# Patient Record
Sex: FEMALE | Race: WHITE | NOT HISPANIC OR LATINO | Employment: FULL TIME | URBAN - METROPOLITAN AREA
[De-identification: names, ages, dates, MRNs, and addresses within clinical notes are randomized per-mention and may not be internally consistent; named-entity substitution may affect disease eponyms.]

---

## 2017-06-08 ENCOUNTER — ALLSCRIPTS OFFICE VISIT (OUTPATIENT)
Dept: OTHER | Facility: OTHER | Age: 52
End: 2017-06-08

## 2017-06-28 DIAGNOSIS — Z12.31 ENCOUNTER FOR SCREENING MAMMOGRAM FOR MALIGNANT NEOPLASM OF BREAST: ICD-10-CM

## 2018-01-13 VITALS
WEIGHT: 175 LBS | HEIGHT: 64 IN | BODY MASS INDEX: 29.88 KG/M2 | RESPIRATION RATE: 18 BRPM | TEMPERATURE: 98.2 F | HEART RATE: 80 BPM | DIASTOLIC BLOOD PRESSURE: 68 MMHG | SYSTOLIC BLOOD PRESSURE: 126 MMHG

## 2018-01-13 NOTE — RESULT NOTES
Verified Results  (1) COMPREHENSIVE METABOLIC PANEL [id number] 07:04QM Edgar Greenwood    Order Number: [id number]      National Kidney Disease Education Program recommendations are as follows:  GFR calculation is accurate only with a steady state creatinine  Chronic Kidney disease less than 60 ml/min/1 73 sq  meters  Kidney failure less than 15 ml/min/1 73 sq  meters  Test Name Result Flag Reference   GLUCOSE,RANDM 96 mg/dL     If the patient is fasting, the ADA then defines impaired fasting glucose as > 100 mg/dL and diabetes as > or equal to 123 mg/dL  SODIUM 140 mmol/L  136-145   POTASSIUM 4 4 mmol/L  3 5-5 3   CHLORIDE 106 mmol/L  100-108   CARBON DIOXIDE 27 mmol/L  21-32   ANION GAP (CALC) 7 mmol/L  4-13   BLOOD UREA NITROGEN 15 mg/dL  5-25   CREATININE 0 74 mg/dL  0 60-1 30   Standardized to IDMS reference method   CALCIUM 8 5 mg/dL  8 3-10 1   BILI, TOTAL 0 63 mg/dL  0 20-1 00   ALK PHOSPHATAS 54 U/L     ALT (SGPT) 19 U/L  12-78   AST(SGOT) 9 U/L  5-45   ALBUMIN 3 7 g/dL  3 5-5 0   TOTAL PROTEIN 6 9 g/dL  6 4-8 2   eGFR Non-African American      >60 0 ml/min/1 73sq m     (1) LIPID PANEL FASTING W DIRECT LDL REFLEX 04Apr2016 11:07AM Barbie Cheung Order Number: [id number]      Triglyceride:         Normal              <150 mg/dl       Borderline High    150-199 mg/dl       High               200-499 mg/dl       Very High          >499 mg/dl  Cholesterol:         Desirable        <200 mg/dl      Borderline High  200-239 mg/dl      High             >239 mg/dl  HDL Cholesterol:        High    >59 mg/dL      Low     <41 mg/dL  LDL Cholesterol:        Optimal          <100 mg/dl         Near Optimal     100-129 mg/dl        Above Optimal          Borderline High   130-159 mg/dl          High              160-189 mg/dl          Very High        >189 mg/dl  LDL CALCULATED:    This screening LDL is a calculated result    It does not have the accuracy of the Direct Measured LDL in the monitoring of patients with hyperlipidemia and/or statin therapy  Direct Measure LDL (FAH927) must be ordered separately in these patients  Test Name Result Flag Reference   CHOLESTEROL 173 mg/dL     LDL CHOLESTEROL CALCULATED 101 mg/dL H 0-100   TRIGLYCERIDES 41 mg/dL  <=150   Specimen collection should occur prior to N-Acetylcysteine or Metamizole administration due to the potential for falsely depressed results  HDL,DIRECT 64 mg/dL H 40-60     (1) TSH WITH FT4 REFLEX 04Apr2016 11:07AM Pj Chavezetta Order Number: [id number]    Patients undergoing fluorescein dye angiography may retain small amounts of fluorescein in the body for 48-72 hours post procedure  Samples containing fluorescein can produce falsely depressed TSH values  If the patient had this procedure,a specimen should be resubmitted post fluorescein clearance  The recommended reference ranges for TSH during pregnancy are as follows:  First trimester 0 1 to 2 5 uIU/mL  Second trimester  0 2 to 3 0 uIU/mL  Third trimester 0 3 to 3 0 uIU/m     Test Name Result Flag Reference   TSH 1 630 uIU/mL  0 358-3 740     (1) CBC/PLT/DIFF 04Apr2016 11:07AM Pj José Luis Order Number: [id number]     Order Number: [id number]     Test Name Result Flag Reference   WBC COUNT 7 02 Thousand/uL  4 31-10 16   RBC COUNT 4 70 Million/uL  3 81-5 12   HEMOGLOBIN 14 6 g/dL  11 5-15 4   HEMATOCRIT 42 6 %  34 8-46  1   MCV 91 fL  82-98   MCH 31 1 pg  26 8-34 3   MCHC 34 3 g/dL  31 4-37 4   RDW 13 1 %  11 6-15 1   MPV 9 7 fL  8 9-12 7   PLATELET COUNT 435 Thousands/uL  149-390   nRBC AUTOMATED 0 /100 WBCs     NEUTROPHILS RELATIVE PERCENT 68 %  43-75   LYMPHOCYTES RELATIVE PERCENT 21 %  14-44   MONOCYTES RELATIVE PERCENT 6 %  4-12   EOSINOPHILS RELATIVE PERCENT 4 %  0-6   BASOPHILS RELATIVE PERCENT 1 %  0-1   NEUTROPHILS ABSOLUTE COUNT 4 80 Thousands/µL  1 85-7 62   LYMPHOCYTES ABSOLUTE COUNT 1 46 Thousands/µL  0 60-4 47   MONOCYTES ABSOLUTE COUNT 0 43 Thousand/µL  0 17-1 22   EOSINOPHILS ABSOLUTE COUNT 0 28 Thousand/µL  0 00-0 61   BASOPHILS ABSOLUTE COUNT 0 04 Thousands/µL  0 00-0 10     (1) VITAMIN D 25-HYDROXY 04Apr2016 11:07AM [facility] Order Number: [id number]     Order Number: [id number]     Test Name Result Flag Reference   VIT D 25-HYDROX 23 5 ng/mL L 30 0-100 0       Discussion/Summary   OVERALL GOOD  YOU NEED SOME VITAMIN D3 DAILY, 2000IU       [facility]

## 2018-01-13 NOTE — PROGRESS NOTES
Assessment    1  Well adult on routine health check (V70 0) (Z00 00)   2  Lipid screening (V77 91) (Z13 220)   3  Colon cancer screening (V76 51) (Z12 11)    Plan  Allergic reaction    · EpiPen 2-Irvin 0 3 MG/0 3ML Injection Solution Auto-injector; use as directed  Allergic reaction, Fatigue, Lipid screening    · (1) ALLERGY, FOOD PANEL; Status:Active; Requested for:09Bxi4144;    · (1) CBC/PLT/DIFF; Status:Active; Requested for:89Ynl8940;    · (1) COMPREHENSIVE METABOLIC PANEL; Status:Active; Requested for:08Kbk1752;    · (1) LIPID PANEL FASTING W DIRECT LDL REFLEX; Status:Active; Requested  for:31Cop4386;    · (1) TSH WITH FT4 REFLEX; Status:Active; Requested for:31Kvw3608;    · (1) VITAMIN D 25-HYDROXY; Status:Active; Requested for:08Ehh9847;   Colon cancer screening    · COLONOSCOPY; Status:Active; Requested for:29Psy7148;    · 2 - Zeeshan Harrell MD, Kirk Gaytan  Colorectal Surgery, Gastroenterology Physician Referral   Consult  Status: Hold For - Scheduling  Requested for: 25UVN7146  Care Summary provided  : Yes    Discussion/Summary  health maintenance visit Currently, she eats a healthy diet and has an adequate exercise regimen  cervical cancer screening is current Breast cancer screening: mammogram has been ordered  Colorectal cancer screening: colonoscopy has been ordered  Screening lab work includes hemoglobin, glucose, lipid profile and thyroid function testing  The immunizations are up to date  Advice and education were given regarding vitamin D supplements  Patient discussion: discussed with the patient  Chief Complaint  Patient here for Annual Wellness Exam      History of Present Illness  HM, Adult Female: The patient is being seen for a health maintenance evaluation  General Health: The patient's health since the last visit is described as good  She has regular dental visits  She denies vision problems  She denies hearing loss  Immunizations status: up to date  Lifestyle:   She consumes a diverse and healthy diet  She exercises regularly  She does not use tobacco  She denies alcohol use  She denies drug use  Reproductive health:  she reports normal menses  pregnancy history:  Screening: cancer screening reviewed and updated  Cervical cancer screening includes a pap smear performed last year  Breast cancer screening includes a mammogram performed last year  She hasn't been previously screened for colorectal cancer  metabolic screening reviewed and updated  Metabolic screening includes lipid profile performed within the past five years, glucose screening performed last year and thyroid function test performed last year  Review of Systems    Constitutional: No fever, no chills, feels well, no tiredness, no recent weight gain or weight loss  Eyes: No complaints of eye pain, no red eyes, no eyesight problems, no discharge, no dry eyes, no itching of eyes  ENT: no complaints of earache, no loss of hearing, no nose bleeds, no nasal discharge, no sore throat, no hoarseness  Cardiovascular: No complaints of slow heart rate, no fast heart rate, no chest pain, no palpitations, no leg claudication, no lower extremity edema  Respiratory: No complaints of shortness of breath, no wheezing, no cough, no SOB on exertion, no orthopnea, no PND  Gastrointestinal: No complaints of abdominal pain, no constipation, no nausea or vomiting, no diarrhea, no bloody stools  Genitourinary: No complaints of dysuria, no incontinence, no pelvic pain, no dysmenorrhea, no vaginal discharge or bleeding  Musculoskeletal: No complaints of arthralgias, no myalgias, no joint swelling or stiffness, no limb pain or swelling  Integumentary: No complaints of skin rash or lesions, no itching, no skin wounds, no breast pain or lump  Neurological: No complaints of headache, no confusion, no convulsions, no numbness, no dizziness or fainting, no tingling, no limb weakness, no difficulty walking     Psychiatric: Not suicidal, no sleep disturbance, no anxiety or depression, no change in personality, no emotional problems  Endocrine: No complaints of proptosis, no hot flashes, no muscle weakness, no deepening of the voice, no feelings of weakness  Hematologic/Lymphatic: No complaints of swollen glands, no swollen glands in the neck, does not bleed easily, does not bruise easily  Active Problems    1  Allergic reaction (995 3) (T78 40XA)   2  Fatigue (780 79) (R53 83)   3  Lipid screening (V77 91) (Z13 220)   4  Thoracic outlet syndrome (353 0) (G54 0)    Past Medical History    · History of chest pain (V13 89) (Z24 812)   · Personal history of endometriosis (V13 29) (Z87 42)   · History of Thoracic outlet syndrome (353 0) (G54 0)    Surgical History    · History of Ankle Surgery   · History of Arthroscopy Shoulder Right   · History of Breast Surgery   · History of Extrapleural Rib Resection   · History of Hernia Repair   · History of Shoulder Surgery   · History of Thorax Excision Of First Rib   · History of Treatment Of Ankle Fracture   · History of Umbilical Hernia Repair    Family History    · Family history of Diabetes Mellitus (V18 0)    Social History    · Never A Smoker   · Never Drank Alcohol    Current Meds   1  EpiPen 2-Irvin 0 3 MG/0 3ML Injection Solution Auto-injector; use as directed; Therapy: [id number] to (Last Rx:21Jan2016)  Requested for: 21Jan2016 Ordered    Allergies    1  Ethanol    2  No Known Environmental Allergies   3  No Known Food Allergies    Vitals   Recorded: [id number] 09:37AM   Heart Rate 72   Respiration 16   Systolic 495   Diastolic 72   Height 5 ft 4 49 in   Weight 169 lb 4 oz   BMI Calculated 28 61   BSA Calculated 1 83     Physical Exam    Constitutional   General appearance: No acute distress, well appearing and well nourished  Head and Face   Head and face: Normal     Eyes   Conjunctiva and lids: No swelling, erythema or discharge  Pupils and irises: Equal, round, reactive to light      Ears, Nose, Mouth, and Throat   External inspection of ears and nose: Normal     Otoscopic examination: Tympanic membranes translucent with normal light reflex  Canals patent without erythema  Hearing: Normal     Nasal mucosa, septum, and turbinates: Normal without edema or erythema  Lips, teeth, and gums: Normal, good dentition  Oropharynx: Normal with no erythema, edema, exudate or lesions  Neck   Neck: Supple, symmetric, trachea midline, no masses  Thyroid: Normal, no thyromegaly  Pulmonary   Respiratory effort: No increased work of breathing or signs of respiratory distress  Auscultation of lungs: Clear to auscultation  Cardiovascular   Auscultation of heart: Normal rate and rhythm, normal S1 and S2, no murmurs  Examination of extremities for edema and/or varicosities: Normal     Abdomen   Abdomen: Non-tender, no masses  Liver and spleen: No hepatomegaly or splenomegaly  Lymphatic   Palpation of lymph nodes in neck: No lymphadenopathy  Palpation of lymph nodes in axillae: No lymphadenopathy  Musculoskeletal   Gait and station: Normal     Digits and nails: Normal without clubbing or cyanosis  Joints, bones, and muscles: Normal     Range of motion: Normal     Stability: Normal     Muscle strength/tone: Normal     Skin   Skin and subcutaneous tissue: Normal without rashes or lesions  Palpation of skin and subcutaneous tissue: Normal turgor  Neurologic   Cranial nerves: Cranial nerves II-XII intact  Cortical function: Normal mental status  Reflexes: 2+ and symmetric  Sensation: No sensory loss  Coordination: Normal finger to nose and heel to shin  Psychiatric   Judgment and insight: Normal     Orientation to person, place, and time: Normal     Recent and remote memory: Intact      Mood and affect: Normal        Results/Data  PHQ-2 Adult Depression Screening [id number] 09:39AM User, Ahs     Test Name Result Flag Reference   PHQ-2 Adult Depression Score 0     Q1: 0, Q2: 0   PHQ-2 Adult Depression Screening Negative         Signatures   Electronically signed by : Janes Moffett DO; Feb 25 2016  9:53AM EST                       (Author)

## 2018-01-18 NOTE — PROGRESS NOTES
Assessment    1  Allergic reaction (995 3) (T78 40XA)    Plan  Allergic reaction    · HydrOXYzine HCl - 25 MG Oral Tablet; 1-3 tabs po q 6 hours prn pruritis   · Bolivar Guzman MD, [facility]  (Allergy/Immunology) Physician Referral  Consult   Status: Hold For - Scheduling  Requested for: 47RMJ2265  Care Summary provided  : Yes    Chief Complaint  Patient here with possible allergic reaction that comes and goes not sure from what for 2 weeks      History of Present Illness  HPI: 2-3 WEEKS OF ITCHING THROAT, HIVES   Allergic Reaction: The patient is being seen for an initial evaluation of an allergic reaction  Symptoms:  hives, rash and throat tightness, but no facial swelling, no eyelid swelling, no lip swelling and no tongue swelling  No associated symptoms are reported  Current treatment includes allergen avoidance  Review of Systems    Constitutional: No fever, no chills, feels well, no tiredness, no recent weight gain or loss  ENT: no ear ache, no loss of hearing, no nosebleeds or nasal discharge, no sore throat or hoarseness  Cardiovascular: no complaints of slow or fast heart rate, no chest pain, no palpitations, no leg claudication or lower extremity edema  Respiratory: no complaints of shortness of breath, no wheezing, no dyspnea on exertion, no orthopnea or PND  Breasts: no complaints of breast pain, breast lump or nipple discharge  Gastrointestinal: no complaints of abdominal pain, no constipation, no nausea or diarrhea, no vomiting, no bloody stools  Genitourinary: no complaints of dysuria, no incontinence, no pelvic pain, no dysmenorrhea, no vaginal discharge or abnormal vaginal bleeding  Musculoskeletal: no complaints of arthralgia, no myalgia, no joint swelling or stiffness, no limb pain or swelling  Integumentary: as noted in HPI  Neurological: no complaints of headache, no confusion, no numbness or tingling, no dizziness or fainting  Active Problems    1   Allergic reaction (995 3) (T78 40XA)   2  Fatigue (780 79) (R53 83)   3  Lipid screening (V77 91) (Z13 220)   4  Thoracic outlet syndrome (353 0) (G54 0)    Past Medical History    1  History of chest pain (V13 89) (Z87 898)   2  Personal history of endometriosis (V13 29) (Z87 42)   3  History of Thoracic outlet syndrome (353 0) (G54 0)    Family History    1  Family history of Diabetes Mellitus (V18 0)    Social History    · Never A Smoker   · Never Drank Alcohol    Surgical History    1  History of Ankle Surgery   2  History of Arthroscopy Shoulder Right   3  History of Breast Surgery   4  History of Extrapleural Rib Resection   5  History of Hernia Repair   6  History of Shoulder Surgery   7  History of Thorax Excision Of First Rib   8  History of Treatment Of Ankle Fracture   9  History of Umbilical Hernia Repair    Current Meds   1  EpiPen 2-Irvin 0 3 MG/0 3ML Injection Solution Auto-injector; use as directed; Therapy: [id number] to (Last Rx:21Jan2016)  Requested for: 21Jan2016 Ordered    Allergies    1  Ethanol    2  No Known Environmental Allergies   3  No Known Food Allergies    Vitals   Recorded: 93AVB2254 10:40AM   Heart Rate 88   Respiration 20   Systolic 642   Diastolic 68   Height 5 ft 4 in   Weight 175 lb 8 oz   BMI Calculated 30 12   BSA Calculated 1 85     Physical Exam    Constitutional   General appearance: No acute distress, well appearing and well nourished  Pulmonary   Respiratory effort: No increased work of breathing or signs of respiratory distress  Auscultation of lungs: Clear to auscultation  Cardiovascular   Auscultation of heart: Normal rate and rhythm, normal S1 and S2, without murmurs  Abdomen   Abdomen: Non-tender, no masses  Liver and spleen: No hepatomegaly or splenomegaly  Lymphatic   Palpation of lymph nodes in neck: No lymphadenopathy           Signatures   Electronically signed by : Elena Randhawa DO; Jan 22 2016 10:52AM EST                       (Author)

## 2018-12-17 ENCOUNTER — OFFICE VISIT (OUTPATIENT)
Dept: FAMILY MEDICINE CLINIC | Facility: CLINIC | Age: 53
End: 2018-12-17
Payer: COMMERCIAL

## 2018-12-17 VITALS
BODY MASS INDEX: 33.32 KG/M2 | WEIGHT: 195.2 LBS | HEIGHT: 64 IN | HEART RATE: 62 BPM | OXYGEN SATURATION: 100 % | SYSTOLIC BLOOD PRESSURE: 118 MMHG | DIASTOLIC BLOOD PRESSURE: 80 MMHG

## 2018-12-17 DIAGNOSIS — R63.5 WEIGHT GAIN: Chronic | ICD-10-CM

## 2018-12-17 DIAGNOSIS — Z12.11 SCREENING FOR COLON CANCER: ICD-10-CM

## 2018-12-17 DIAGNOSIS — Z00.00 WELLNESS EXAMINATION: Primary | ICD-10-CM

## 2018-12-17 DIAGNOSIS — R53.83 FATIGUE, UNSPECIFIED TYPE: ICD-10-CM

## 2018-12-17 DIAGNOSIS — Z12.39 SCREENING FOR BREAST CANCER: ICD-10-CM

## 2018-12-17 DIAGNOSIS — Z13.1 SCREENING FOR DIABETES MELLITUS: ICD-10-CM

## 2018-12-17 PROCEDURE — 99396 PREV VISIT EST AGE 40-64: CPT | Performed by: FAMILY MEDICINE

## 2018-12-17 RX ORDER — CLONIDINE 0.1 MG/24H
PATCH, EXTENDED RELEASE TRANSDERMAL WEEKLY
Refills: 2 | COMMUNITY
Start: 2018-11-14 | End: 2021-10-15

## 2018-12-17 RX ORDER — LIDOCAINE 50 MG/G
OINTMENT TOPICAL
Refills: 1 | COMMUNITY
Start: 2018-11-14

## 2018-12-17 RX ORDER — TRAMADOL HYDROCHLORIDE 50 MG/1
50 TABLET ORAL
Refills: 0 | COMMUNITY
Start: 2018-10-04 | End: 2020-06-16 | Stop reason: ALTCHOICE

## 2018-12-17 RX ORDER — EPINEPHRINE 0.3 MG/.3ML
INJECTION SUBCUTANEOUS
COMMUNITY
Start: 2016-01-26 | End: 2020-12-30 | Stop reason: SDUPTHER

## 2018-12-17 NOTE — PROGRESS NOTES
Assessment/Plan:    No problem-specific Assessment & Plan notes found for this encounter  Diagnoses and all orders for this visit:    Wellness examination  Comments:  Overall healthy on exam   Pt to continue a lower carb diet, and try to get regular exercise  FBW ordered  Weight gain  Comments:  TSH incl with FBW, and pt referred to Nutrition Services  Orders:  -     Ambulatory referral to Nutrition Services; Future    Screening for diabetes mellitus  -     Lipid Panel with Direct LDL reflex; Future  -     Comprehensive metabolic panel; Future    Screening for breast cancer  Comments:  Mammogram ordered  Orders:  -     Mammo screening bilateral w 3d & cad; Future    Fatigue, unspecified type  -     CBC and differential; Future  -     TSH, 3rd generation with Free T4 reflex; Future    Screening for colon cancer  Comments:  Pt referred to GI locally  Orders:  -     Ambulatory referral to Gastroenterology; Future    Other orders  -     cloNIDine (CATAPRES-TTS-1) 0 1 mg/24 hr; APPLY 1 PATCH EVERY 3 DAYS  -     EPINEPHrine (EPIPEN 2-STEPH) 0 3 mg/0 3 mL SOAJ;   -     lidocaine (XYLOCAINE) 5 % ointment; APPLY TO AFFECTED AREAS AS DIRECTED  -     traMADol (ULTRAM) 50 mg tablet; Take 50 mg by mouth daily at bedtime          Subjective:      Patient ID: Ping Velasquez is a 48 y o  female  PAP smear is UTD - 1 5 years ago  Due for mammogram   Seeing Dentist soon  Trying to get regular exercise  Limited due to a past foot injury; followed by specialists with [facility]  No colonoscopy as of yet  Had the Flu Vaccine this year            The following portions of the patient's history were reviewed and updated as appropriate: allergies, current medications, past family history, past social history, past surgical history and problem list     Past Medical History:   Diagnosis Date    Chest pain     last assessed 07/08/13    Endometriosis     last assessed 06/10/14    Thoracic outlet syndrome Current Outpatient Prescriptions:     EPINEPHrine (EPIPEN 2-STEPH) 0 3 mg/0 3 mL SOAJ, , Disp: , Rfl:     cloNIDine (CATAPRES-TTS-1) 0 1 mg/24 hr, APPLY 1 PATCH EVERY 3 DAYS, Disp: , Rfl: 2    lidocaine (XYLOCAINE) 5 % ointment, APPLY TO AFFECTED AREAS AS DIRECTED , Disp: , Rfl: 1    traMADol (ULTRAM) 50 mg tablet, Take 50 mg by mouth daily at bedtime, Disp: , Rfl: 0    No Known Allergies    Social History   Substance Use Topics    Smoking status: Never Smoker    Smokeless tobacco: Not on file    Alcohol use No         Review of Systems   Constitutional: Negative for activity change  +Chronic trouble losing weight  Respiratory: Negative for shortness of breath  Cardiovascular: Negative for chest pain  Gastrointestinal: Negative for abdominal pain and blood in stool  Genitourinary: Negative for menstrual problem  Objective:      /80 (BP Location: Left arm, Patient Position: Sitting, Cuff Size: Standard)   Pulse 62   Ht 5' 3 9" (1 623 m)   Wt 88 5 kg (195 lb 3 2 oz)   SpO2 100%   BMI 33 61 kg/m²          Physical Exam   Constitutional: She is oriented to person, place, and time  She appears well-developed and well-nourished  No distress  HENT:   Head: Normocephalic and atraumatic  Right Ear: Hearing, tympanic membrane, external ear and ear canal normal    Left Ear: Hearing, tympanic membrane, external ear and ear canal normal    Mouth/Throat: Oropharynx is clear and moist  No oropharyngeal exudate  Eyes: Conjunctivae are normal    Neck: Normal range of motion  Neck supple  No thyromegaly present  Cardiovascular: Normal rate, regular rhythm and normal heart sounds  Exam reveals no gallop and no friction rub  No murmur heard  Pulmonary/Chest: Effort normal and breath sounds normal  No respiratory distress  She has no wheezes  She has no rales  Abdominal: Soft  Bowel sounds are normal  She exhibits no distension and no mass  There is no tenderness   There is no rebound and no guarding  Lymphadenopathy:     She has no cervical adenopathy  Neurological: She is alert and oriented to person, place, and time  Skin: She is not diaphoretic  Psychiatric: She has a normal mood and affect  Her behavior is normal  Judgment and thought content normal    Nursing note and vitals reviewed

## 2018-12-27 ENCOUNTER — LAB (OUTPATIENT)
Dept: LAB | Facility: CLINIC | Age: 53
End: 2018-12-27
Payer: COMMERCIAL

## 2018-12-27 DIAGNOSIS — Z13.1 SCREENING FOR DIABETES MELLITUS: ICD-10-CM

## 2018-12-27 DIAGNOSIS — R53.83 FATIGUE, UNSPECIFIED TYPE: ICD-10-CM

## 2018-12-27 LAB
ALBUMIN SERPL BCP-MCNC: 4 G/DL (ref 3.5–5)
ALP SERPL-CCNC: 68 U/L (ref 46–116)
ALT SERPL W P-5'-P-CCNC: 29 U/L (ref 12–78)
ANION GAP SERPL CALCULATED.3IONS-SCNC: 6 MMOL/L (ref 4–13)
AST SERPL W P-5'-P-CCNC: 18 U/L (ref 5–45)
BASOPHILS # BLD AUTO: 0.06 THOUSANDS/ΜL (ref 0–0.1)
BASOPHILS NFR BLD AUTO: 1 % (ref 0–1)
BILIRUB SERPL-MCNC: 0.4 MG/DL (ref 0.2–1)
BUN SERPL-MCNC: 14 MG/DL (ref 5–25)
CALCIUM SERPL-MCNC: 9.2 MG/DL (ref 8.3–10.1)
CHLORIDE SERPL-SCNC: 106 MMOL/L (ref 100–108)
CHOLEST SERPL-MCNC: 188 MG/DL (ref 50–200)
CO2 SERPL-SCNC: 28 MMOL/L (ref 21–32)
CREAT SERPL-MCNC: 0.84 MG/DL (ref 0.6–1.3)
EOSINOPHIL # BLD AUTO: 0.3 THOUSAND/ΜL (ref 0–0.61)
EOSINOPHIL NFR BLD AUTO: 4 % (ref 0–6)
ERYTHROCYTE [DISTWIDTH] IN BLOOD BY AUTOMATED COUNT: 12.3 % (ref 11.6–15.1)
GFR SERPL CREATININE-BSD FRML MDRD: 80 ML/MIN/1.73SQ M
GLUCOSE P FAST SERPL-MCNC: 91 MG/DL (ref 65–99)
HCT VFR BLD AUTO: 43.8 % (ref 34.8–46.1)
HDLC SERPL-MCNC: 55 MG/DL (ref 40–60)
HGB BLD-MCNC: 14.8 G/DL (ref 11.5–15.4)
IMM GRANULOCYTES # BLD AUTO: 0.02 THOUSAND/UL (ref 0–0.2)
IMM GRANULOCYTES NFR BLD AUTO: 0 % (ref 0–2)
LDLC SERPL CALC-MCNC: 120 MG/DL (ref 0–100)
LYMPHOCYTES # BLD AUTO: 2.33 THOUSANDS/ΜL (ref 0.6–4.47)
LYMPHOCYTES NFR BLD AUTO: 31 % (ref 14–44)
MCH RBC QN AUTO: 30.6 PG (ref 26.8–34.3)
MCHC RBC AUTO-ENTMCNC: 33.8 G/DL (ref 31.4–37.4)
MCV RBC AUTO: 91 FL (ref 82–98)
MONOCYTES # BLD AUTO: 0.54 THOUSAND/ΜL (ref 0.17–1.22)
MONOCYTES NFR BLD AUTO: 7 % (ref 4–12)
NEUTROPHILS # BLD AUTO: 4.33 THOUSANDS/ΜL (ref 1.85–7.62)
NEUTS SEG NFR BLD AUTO: 57 % (ref 43–75)
NRBC BLD AUTO-RTO: 0 /100 WBCS
PLATELET # BLD AUTO: 274 THOUSANDS/UL (ref 149–390)
PMV BLD AUTO: 9.4 FL (ref 8.9–12.7)
POTASSIUM SERPL-SCNC: 4.1 MMOL/L (ref 3.5–5.3)
PROT SERPL-MCNC: 8 G/DL (ref 6.4–8.2)
RBC # BLD AUTO: 4.83 MILLION/UL (ref 3.81–5.12)
SODIUM SERPL-SCNC: 140 MMOL/L (ref 136–145)
TRIGL SERPL-MCNC: 66 MG/DL
TSH SERPL DL<=0.05 MIU/L-ACNC: 3.15 UIU/ML (ref 0.36–3.74)
WBC # BLD AUTO: 7.58 THOUSAND/UL (ref 4.31–10.16)

## 2018-12-27 PROCEDURE — 36415 COLL VENOUS BLD VENIPUNCTURE: CPT

## 2018-12-27 PROCEDURE — 85025 COMPLETE CBC W/AUTO DIFF WBC: CPT

## 2018-12-27 PROCEDURE — 80061 LIPID PANEL: CPT

## 2018-12-27 PROCEDURE — 84443 ASSAY THYROID STIM HORMONE: CPT

## 2018-12-27 PROCEDURE — 80053 COMPREHEN METABOLIC PANEL: CPT

## 2018-12-30 NOTE — PROGRESS NOTES
OK to mail  Please let the patient know that their bloodwork was normal   Thanks     Dr Matt Alvarenga

## 2019-09-13 DIAGNOSIS — Z12.39 SCREENING FOR BREAST CANCER: Primary | ICD-10-CM

## 2019-09-13 DIAGNOSIS — Z12.11 SCREENING FOR COLON CANCER: ICD-10-CM

## 2019-12-19 ENCOUNTER — TELEPHONE (OUTPATIENT)
Dept: FAMILY MEDICINE CLINIC | Facility: CLINIC | Age: 54
End: 2019-12-19

## 2019-12-19 NOTE — TELEPHONE ENCOUNTER
After the scheduling mishap?? Marco Rodriguez was unable to stay due to her doc appt @ 4:15pm     She would like to get in by the end of the year  Can you fit her in as late as possible on Monday, 12/23/19? Thank you!

## 2019-12-23 ENCOUNTER — OFFICE VISIT (OUTPATIENT)
Dept: FAMILY MEDICINE CLINIC | Facility: CLINIC | Age: 54
End: 2019-12-23
Payer: COMMERCIAL

## 2019-12-23 ENCOUNTER — TELEPHONE (OUTPATIENT)
Dept: FAMILY MEDICINE CLINIC | Facility: CLINIC | Age: 54
End: 2019-12-23

## 2019-12-23 VITALS
SYSTOLIC BLOOD PRESSURE: 130 MMHG | OXYGEN SATURATION: 97 % | HEIGHT: 64 IN | WEIGHT: 194.2 LBS | TEMPERATURE: 97.8 F | HEART RATE: 86 BPM | BODY MASS INDEX: 33.16 KG/M2 | RESPIRATION RATE: 14 BRPM | DIASTOLIC BLOOD PRESSURE: 80 MMHG

## 2019-12-23 DIAGNOSIS — Z13.220 SCREENING, LIPID: ICD-10-CM

## 2019-12-23 DIAGNOSIS — Z13.1 SCREENING FOR DIABETES MELLITUS: ICD-10-CM

## 2019-12-23 DIAGNOSIS — Z12.31 ENCOUNTER FOR SCREENING MAMMOGRAM FOR BREAST CANCER: ICD-10-CM

## 2019-12-23 DIAGNOSIS — Z00.00 ANNUAL PHYSICAL EXAM: Primary | ICD-10-CM

## 2019-12-23 DIAGNOSIS — Z12.11 SCREENING FOR COLORECTAL CANCER: ICD-10-CM

## 2019-12-23 DIAGNOSIS — Z12.12 SCREENING FOR COLORECTAL CANCER: ICD-10-CM

## 2019-12-23 PROBLEM — M79.672 CHRONIC FOOT PAIN, LEFT: Status: ACTIVE | Noted: 2019-12-23

## 2019-12-23 PROBLEM — L50.1 IDIOPATHIC URTICARIA: Status: ACTIVE | Noted: 2017-06-08

## 2019-12-23 PROBLEM — G89.29 CHRONIC FOOT PAIN, LEFT: Status: ACTIVE | Noted: 2019-12-23

## 2019-12-23 PROCEDURE — 99396 PREV VISIT EST AGE 40-64: CPT | Performed by: FAMILY MEDICINE

## 2019-12-23 NOTE — TELEPHONE ENCOUNTER
PT ASKED FOR A REFERRAL FOR A NUTRIONIST AND WANTED TO MAKE SURE YOU DID PUT IN FOR THE COLOGARD  PLS ADVISE SHE WOULD LIKE ME TO EMAIL TO [email or web address]  [employer]

## 2019-12-23 NOTE — PROGRESS NOTES
[address] [facility]    NAME: Yahir Zuniga  AGE: 47 y o  SEX: female  : 1965     DATE: 2019     Assessment and Plan:     Problem List Items Addressed This Visit        Other    Annual physical exam - Primary    Encounter for screening mammogram for breast cancer    Relevant Orders    Mammo screening bilateral w 3d & cad    Screening for colorectal cancer    Relevant Orders    Ambulatory referral to Colorectal Surgery    Cologuard    Screening, lipid    Relevant Orders    Lipid Panel with Direct LDL reflex    Screening for diabetes mellitus    Relevant Orders    Comprehensive metabolic panel          Immunizations and preventive care screenings were discussed with patient today  Appropriate education was printed on patient's after visit summary  Counseling:  · Dental Health: discussed importance of regular tooth brushing, flossing, and dental visits  BMI Counseling: Body mass index is 33 36 kg/m²  The BMI is above normal  Nutrition recommendations include encouraging healthy choices of fruits and vegetables  Exercise recommendations include exercising 3-5 times per week  No pharmacotherapy was ordered  Return in 1 year (on 2020)  Chief Complaint:     Chief Complaint   Patient presents with    Annual Exam     Patient here for annual wellness exam       History of Present Illness:     Adult Annual Physical   Patient here for a comprehensive physical exam  The patient reports problems - foot issue  Diet and Physical Activity  · Diet/Nutrition: well balanced diet  · Exercise: no formal exercise  Depression Screening  PHQ-9 Depression Screening    PHQ-9:    Frequency of the following problems over the past two weeks:       Little interest or pleasure in doing things:  0 - not at all  Feeling down, depressed, or hopeless:  0 - not at all  PHQ-2 Score:  0       General Health  · Sleep: sleeps well  · Hearing: normal - bilateral   · Vision: no vision problems and most recent eye exam <1 year ago  · Dental: regular dental visits and brushes teeth twice daily  /GYN Health  · Patient is: postmenopausal  · Last menstrual period: n/a  · Contraceptive method: n/a  Review of Systems:     Review of Systems   Constitutional: Negative  HENT: Negative  Eyes: Negative  Respiratory: Negative  Cardiovascular: Negative  Gastrointestinal: Negative  Endocrine: Negative  Genitourinary: Negative  Musculoskeletal: Negative  Skin: Negative  Allergic/Immunologic: Negative  Neurological: Negative  Hematological: Negative  Psychiatric/Behavioral: Negative         Past Medical History:     Past Medical History:   Diagnosis Date    Chest pain     last assessed 07/08/13    Endometriosis     last assessed 06/10/14    Thoracic outlet syndrome       Past Surgical History:     Past Surgical History:   Procedure Laterality Date    ANKLE FRACTURE SURGERY      ANKLE SURGERY      BREAST SURGERY Left     HERNIA REPAIR      INCISION AND DRAINAGE ANTERIOR THORAX      RESECTION RIBS EXTRAPLEURAL      SHOULDER ARTHROSCOPY Right     UMBILICAL HERNIA REPAIR        Social History:     Social History     Socioeconomic History    Marital status: /Civil Union     Spouse name: None    Number of children: None    Years of education: None    Highest education level: None   Occupational History    None   Social Needs    Financial resource strain: None    Food insecurity:     Worry: None     Inability: None    Transportation needs:     Medical: None     Non-medical: None   Tobacco Use    Smoking status: Never Smoker   Substance and Sexual Activity    Alcohol use: No    Drug use: None    Sexual activity: None   Lifestyle    Physical activity:     Days per week: None     Minutes per session: None    Stress: None   Relationships    Social connections:     Talks on phone: None Gets together: None     Attends Alevism service: None     Active member of club or organization: None     Attends meetings of clubs or organizations: None     Relationship status: None    Intimate partner violence:     Fear of current or ex partner: None     Emotionally abused: None     Physically abused: None     Forced sexual activity: None   Other Topics Concern    None   Social History Narrative    None      Family History:     Family History   Problem Relation Age of Onset    Diabetes Father       Current Medications:     Current Outpatient Medications   Medication Sig Dispense Refill    cloNIDine (CATAPRES-TTS-1) 0 1 mg/24 hr APPLY 1 PATCH EVERY 3 DAYS  2    EPINEPHrine (EPIPEN 2-STEPH) 0 3 mg/0 3 mL SOAJ       lidocaine (XYLOCAINE) 5 % ointment APPLY TO AFFECTED AREAS AS DIRECTED   1    traMADol (ULTRAM) 50 mg tablet Take 50 mg by mouth daily at bedtime  0     No current facility-administered medications for this visit  Allergies: Allergies   Allergen Reactions    Alcohol Nausea Only      Physical Exam:     /80   Pulse 86   Temp 97 8 °F (36 6 °C)   Resp 14   Ht 5' 3 98" (1 625 m)   Wt 88 1 kg (194 lb 3 2 oz)   SpO2 97%   BMI 33 36 kg/m²     Physical Exam   Constitutional: She is oriented to person, place, and time  Vital signs are normal  She appears well-developed and well-nourished  HENT:   Head: Normocephalic and atraumatic  Nose: Nose normal    Mouth/Throat: Oropharynx is clear and moist    Eyes: Pupils are equal, round, and reactive to light  Conjunctivae, EOM and lids are normal    Neck: Normal range of motion  Neck supple  Cardiovascular: Normal rate, regular rhythm, S1 normal, S2 normal, normal heart sounds and intact distal pulses  Pulmonary/Chest: Effort normal and breath sounds normal    Abdominal: Soft  Bowel sounds are normal    Musculoskeletal: Normal range of motion  Neurological: She is alert and oriented to person, place, and time   She has normal strength and normal reflexes  Skin: Skin is warm, dry and intact  Psychiatric: She has a normal mood and affect  Her speech is normal and behavior is normal  Judgment and thought content normal  Cognition and memory are normal    Nursing note and vitals reviewed        Britta Renteria DO  [address] [facility]

## 2019-12-23 NOTE — PATIENT INSTRUCTIONS

## 2020-05-26 ENCOUNTER — TELEPHONE (OUTPATIENT)
Dept: FAMILY MEDICINE CLINIC | Facility: CLINIC | Age: 55
End: 2020-05-26

## 2020-05-27 DIAGNOSIS — Z13.1 SCREENING FOR DIABETES MELLITUS: Primary | ICD-10-CM

## 2020-05-29 ENCOUNTER — APPOINTMENT (OUTPATIENT)
Dept: LAB | Age: 55
End: 2020-05-29
Payer: COMMERCIAL

## 2020-05-29 DIAGNOSIS — Z13.1 SCREENING FOR DIABETES MELLITUS: ICD-10-CM

## 2020-05-29 DIAGNOSIS — Z13.220 SCREENING, LIPID: ICD-10-CM

## 2020-05-29 LAB
ALBUMIN SERPL BCP-MCNC: 3.8 G/DL (ref 3.5–5)
ALP SERPL-CCNC: 72 U/L (ref 46–116)
ALT SERPL W P-5'-P-CCNC: 26 U/L (ref 12–78)
ANION GAP SERPL CALCULATED.3IONS-SCNC: 5 MMOL/L (ref 4–13)
AST SERPL W P-5'-P-CCNC: 10 U/L (ref 5–45)
BILIRUB SERPL-MCNC: 0.4 MG/DL (ref 0.2–1)
BUN SERPL-MCNC: 19 MG/DL (ref 5–25)
CALCIUM SERPL-MCNC: 9 MG/DL (ref 8.3–10.1)
CHLORIDE SERPL-SCNC: 108 MMOL/L (ref 100–108)
CHOLEST SERPL-MCNC: 191 MG/DL (ref 50–200)
CO2 SERPL-SCNC: 26 MMOL/L (ref 21–32)
CREAT SERPL-MCNC: 0.81 MG/DL (ref 0.6–1.3)
GFR SERPL CREATININE-BSD FRML MDRD: 82 ML/MIN/1.73SQ M
GLUCOSE P FAST SERPL-MCNC: 92 MG/DL (ref 65–99)
HDLC SERPL-MCNC: 63 MG/DL
LDLC SERPL CALC-MCNC: 119 MG/DL (ref 0–100)
POTASSIUM SERPL-SCNC: 4.1 MMOL/L (ref 3.5–5.3)
PROT SERPL-MCNC: 7.5 G/DL (ref 6.4–8.2)
SODIUM SERPL-SCNC: 139 MMOL/L (ref 136–145)
TRIGL SERPL-MCNC: 46 MG/DL

## 2020-05-29 PROCEDURE — 80061 LIPID PANEL: CPT

## 2020-05-29 PROCEDURE — 80053 COMPREHEN METABOLIC PANEL: CPT

## 2020-05-29 PROCEDURE — 36415 COLL VENOUS BLD VENIPUNCTURE: CPT

## 2020-05-29 PROCEDURE — 83036 HEMOGLOBIN GLYCOSYLATED A1C: CPT

## 2020-05-30 LAB
EST. AVERAGE GLUCOSE BLD GHB EST-MCNC: 108 MG/DL
HBA1C MFR BLD: 5.4 %

## 2020-06-16 ENCOUNTER — OFFICE VISIT (OUTPATIENT)
Dept: FAMILY MEDICINE CLINIC | Facility: CLINIC | Age: 55
End: 2020-06-16
Payer: COMMERCIAL

## 2020-06-16 VITALS
HEIGHT: 64 IN | SYSTOLIC BLOOD PRESSURE: 122 MMHG | OXYGEN SATURATION: 98 % | RESPIRATION RATE: 16 BRPM | HEART RATE: 73 BPM | TEMPERATURE: 97.5 F | DIASTOLIC BLOOD PRESSURE: 80 MMHG | WEIGHT: 200 LBS | BODY MASS INDEX: 34.15 KG/M2

## 2020-06-16 DIAGNOSIS — R63.5 WEIGHT INCREASING: Primary | ICD-10-CM

## 2020-06-16 DIAGNOSIS — L50.1 IDIOPATHIC URTICARIA: ICD-10-CM

## 2020-06-16 PROCEDURE — 99213 OFFICE O/P EST LOW 20 MIN: CPT | Performed by: FAMILY MEDICINE

## 2020-06-16 PROCEDURE — 3008F BODY MASS INDEX DOCD: CPT | Performed by: FAMILY MEDICINE

## 2020-06-16 RX ORDER — CELECOXIB 200 MG/1
CAPSULE ORAL AS NEEDED
COMMUNITY
Start: 2020-05-28

## 2020-06-17 ENCOUNTER — TELEPHONE (OUTPATIENT)
Dept: FAMILY MEDICINE CLINIC | Facility: CLINIC | Age: 55
End: 2020-06-17

## 2020-06-17 DIAGNOSIS — R42 DIZZINESS: ICD-10-CM

## 2020-06-17 DIAGNOSIS — E55.9 VITAMIN D DEFICIENCY: Primary | ICD-10-CM

## 2020-06-24 ENCOUNTER — TELEMEDICINE (OUTPATIENT)
Dept: BARIATRICS | Facility: CLINIC | Age: 55
End: 2020-06-24
Payer: COMMERCIAL

## 2020-06-24 VITALS — BODY MASS INDEX: 32.78 KG/M2 | WEIGHT: 192 LBS | HEIGHT: 64 IN

## 2020-06-24 DIAGNOSIS — R63.5 WEIGHT INCREASING: ICD-10-CM

## 2020-06-24 DIAGNOSIS — E66.9 CLASS 1 OBESITY: Primary | ICD-10-CM

## 2020-06-24 PROBLEM — E66.811 CLASS 1 OBESITY: Status: ACTIVE | Noted: 2020-06-24

## 2020-06-24 PROCEDURE — 3008F BODY MASS INDEX DOCD: CPT | Performed by: PHYSICIAN ASSISTANT

## 2020-06-24 PROCEDURE — 99203 OFFICE O/P NEW LOW 30 MIN: CPT | Performed by: PHYSICIAN ASSISTANT

## 2020-06-26 ENCOUNTER — TELEPHONE (OUTPATIENT)
Dept: BARIATRICS | Facility: CLINIC | Age: 55
End: 2020-06-26

## 2020-06-29 DIAGNOSIS — E66.9 CLASS 1 OBESITY: Primary | ICD-10-CM

## 2020-06-29 DIAGNOSIS — R63.5 ABNORMAL WEIGHT GAIN: ICD-10-CM

## 2020-12-01 ENCOUNTER — OFFICE VISIT (OUTPATIENT)
Dept: FAMILY MEDICINE CLINIC | Facility: CLINIC | Age: 55
End: 2020-12-01
Payer: COMMERCIAL

## 2020-12-01 VITALS
RESPIRATION RATE: 16 BRPM | BODY MASS INDEX: 33.29 KG/M2 | TEMPERATURE: 97.1 F | OXYGEN SATURATION: 97 % | WEIGHT: 195 LBS | HEIGHT: 64 IN | DIASTOLIC BLOOD PRESSURE: 90 MMHG | HEART RATE: 81 BPM | SYSTOLIC BLOOD PRESSURE: 124 MMHG

## 2020-12-01 DIAGNOSIS — Z00.00 ANNUAL PHYSICAL EXAM: Primary | ICD-10-CM

## 2020-12-01 DIAGNOSIS — Z12.11 SCREENING FOR COLORECTAL CANCER: ICD-10-CM

## 2020-12-01 DIAGNOSIS — Z12.31 ENCOUNTER FOR SCREENING MAMMOGRAM FOR BREAST CANCER: ICD-10-CM

## 2020-12-01 DIAGNOSIS — Z12.12 SCREENING FOR COLORECTAL CANCER: ICD-10-CM

## 2020-12-01 PROBLEM — G57.82 NEURITIS OF LEFT SURAL NERVE: Status: ACTIVE | Noted: 2020-10-08

## 2020-12-01 PROBLEM — G57.92 NERVE ENTRAPMENT SYNDROME OF LEFT FOOT: Status: ACTIVE | Noted: 2020-10-08

## 2020-12-01 PROBLEM — M19.90 INFLAMMATORY ARTHROPATHY: Status: ACTIVE | Noted: 2020-10-08

## 2020-12-01 PROBLEM — R63.5 WEIGHT INCREASING: Status: RESOLVED | Noted: 2020-06-16 | Resolved: 2020-12-01

## 2020-12-01 PROCEDURE — 99396 PREV VISIT EST AGE 40-64: CPT | Performed by: FAMILY MEDICINE

## 2020-12-01 PROCEDURE — 3008F BODY MASS INDEX DOCD: CPT | Performed by: FAMILY MEDICINE

## 2020-12-01 PROCEDURE — 3725F SCREEN DEPRESSION PERFORMED: CPT | Performed by: FAMILY MEDICINE

## 2020-12-01 RX ORDER — MULTIVIT-MIN/IRON FUM/FOLIC AC 7.5 MG-4
1 TABLET ORAL DAILY
COMMUNITY

## 2020-12-01 RX ORDER — TRAMADOL HYDROCHLORIDE 100 MG/1
CAPSULE ORAL
COMMUNITY
Start: 2020-11-02 | End: 2021-10-15

## 2020-12-30 DIAGNOSIS — T78.40XS ALLERGIC REACTION, SEQUELA: Primary | ICD-10-CM

## 2020-12-30 RX ORDER — EPINEPHRINE 0.3 MG/.3ML
0.3 INJECTION SUBCUTANEOUS ONCE
Qty: 2 EACH | Refills: 1 | Status: SHIPPED | OUTPATIENT
Start: 2020-12-30 | End: 2021-10-15

## 2021-01-19 ENCOUNTER — TELEPHONE (OUTPATIENT)
Dept: FAMILY MEDICINE CLINIC | Facility: CLINIC | Age: 56
End: 2021-01-19

## 2021-01-19 NOTE — TELEPHONE ENCOUNTER
Patient called and wanted to know if you can put in a bone density scan for her , Mom and younger sister both have osteopetrosis and she would like to get checked   Please advise

## 2021-01-28 DIAGNOSIS — Z23 ENCOUNTER FOR IMMUNIZATION: ICD-10-CM

## 2021-02-06 ENCOUNTER — IMMUNIZATIONS (OUTPATIENT)
Dept: FAMILY MEDICINE CLINIC | Facility: HOSPITAL | Age: 56
End: 2021-02-06

## 2021-02-06 DIAGNOSIS — Z23 ENCOUNTER FOR IMMUNIZATION: Primary | ICD-10-CM

## 2021-02-06 PROCEDURE — 91300 SARS-COV-2 / COVID-19 MRNA VACCINE (PFIZER-BIONTECH) 30 MCG: CPT

## 2021-02-06 PROCEDURE — 0001A SARS-COV-2 / COVID-19 MRNA VACCINE (PFIZER-BIONTECH) 30 MCG: CPT

## 2021-02-27 ENCOUNTER — IMMUNIZATIONS (OUTPATIENT)
Dept: FAMILY MEDICINE CLINIC | Facility: HOSPITAL | Age: 56
End: 2021-02-27

## 2021-02-27 DIAGNOSIS — Z23 ENCOUNTER FOR IMMUNIZATION: Primary | ICD-10-CM

## 2021-02-27 PROCEDURE — 91300 SARS-COV-2 / COVID-19 MRNA VACCINE (PFIZER-BIONTECH) 30 MCG: CPT | Performed by: INTERNAL MEDICINE

## 2021-02-27 PROCEDURE — 0002A SARS-COV-2 / COVID-19 MRNA VACCINE (PFIZER-BIONTECH) 30 MCG: CPT | Performed by: INTERNAL MEDICINE

## 2021-03-19 ENCOUNTER — TELEPHONE (OUTPATIENT)
Dept: FAMILY MEDICINE CLINIC | Facility: CLINIC | Age: 56
End: 2021-03-19

## 2021-03-19 NOTE — TELEPHONE ENCOUNTER
Gadiel Johnson had her second pfizer vaccine on 02/27/2021  Since then she has had off and on calf pain similar to when she had blood clots  It only happens every other day once or twice a day  The pain is starteing to last longer and more painful  Spoke with Angelia Lerma and advised patient to go to the ER  Gadiel Johnson agreed to be evaluated at the ER

## 2021-06-11 ENCOUNTER — APPOINTMENT (OUTPATIENT)
Dept: LAB | Facility: CLINIC | Age: 56
End: 2021-06-11
Payer: COMMERCIAL

## 2021-06-11 DIAGNOSIS — R42 DIZZINESS: ICD-10-CM

## 2021-06-11 DIAGNOSIS — R63.5 WEIGHT INCREASING: ICD-10-CM

## 2021-06-11 DIAGNOSIS — L50.1 IDIOPATHIC URTICARIA: ICD-10-CM

## 2021-06-11 DIAGNOSIS — E66.9 CLASS 1 OBESITY: ICD-10-CM

## 2021-06-11 DIAGNOSIS — R63.5 ABNORMAL WEIGHT GAIN: ICD-10-CM

## 2021-06-11 DIAGNOSIS — E55.9 VITAMIN D DEFICIENCY: ICD-10-CM

## 2021-06-11 LAB
25(OH)D3 SERPL-MCNC: 22.9 NG/ML (ref 30–100)
CORTIS AM PEAK SERPL-MCNC: 9.2 UG/DL (ref 4.2–22.4)
ERYTHROCYTE [DISTWIDTH] IN BLOOD BY AUTOMATED COUNT: 12.5 % (ref 11.6–15.1)
FERRITIN SERPL-MCNC: 51 NG/ML (ref 8–388)
HCT VFR BLD AUTO: 43 % (ref 34.8–46.1)
HGB BLD-MCNC: 13.9 G/DL (ref 11.5–15.4)
INSULIN SERPL-ACNC: 9.8 MU/L (ref 3–25)
IRON SERPL-MCNC: 79 UG/DL (ref 50–170)
MCH RBC QN AUTO: 29.3 PG (ref 26.8–34.3)
MCHC RBC AUTO-ENTMCNC: 32.3 G/DL (ref 31.4–37.4)
MCV RBC AUTO: 91 FL (ref 82–98)
PLATELET # BLD AUTO: 260 THOUSANDS/UL (ref 149–390)
PMV BLD AUTO: 9.6 FL (ref 8.9–12.7)
RBC # BLD AUTO: 4.75 MILLION/UL (ref 3.81–5.12)
TSH SERPL DL<=0.05 MIU/L-ACNC: 1.74 UIU/ML (ref 0.36–3.74)
WBC # BLD AUTO: 6.53 THOUSAND/UL (ref 4.31–10.16)

## 2021-06-11 PROCEDURE — 82533 TOTAL CORTISOL: CPT

## 2021-06-11 PROCEDURE — 82728 ASSAY OF FERRITIN: CPT

## 2021-06-11 PROCEDURE — 36415 COLL VENOUS BLD VENIPUNCTURE: CPT

## 2021-06-11 PROCEDURE — 85027 COMPLETE CBC AUTOMATED: CPT

## 2021-06-11 PROCEDURE — 83540 ASSAY OF IRON: CPT

## 2021-06-11 PROCEDURE — 84443 ASSAY THYROID STIM HORMONE: CPT

## 2021-06-11 PROCEDURE — 83525 ASSAY OF INSULIN: CPT

## 2021-06-11 PROCEDURE — 82306 VITAMIN D 25 HYDROXY: CPT

## 2021-07-02 ENCOUNTER — VBI (OUTPATIENT)
Dept: ADMINISTRATIVE | Facility: OTHER | Age: 56
End: 2021-07-02

## 2021-10-14 ENCOUNTER — TELEPHONE (OUTPATIENT)
Dept: FAMILY MEDICINE CLINIC | Facility: CLINIC | Age: 56
End: 2021-10-14

## 2021-11-19 ENCOUNTER — RA CDI HCC (OUTPATIENT)
Dept: OTHER | Facility: HOSPITAL | Age: 56
End: 2021-11-19

## 2021-12-22 ENCOUNTER — TELEPHONE (OUTPATIENT)
Dept: FAMILY MEDICINE CLINIC | Facility: CLINIC | Age: 56
End: 2021-12-22

## 2022-01-03 ENCOUNTER — TELEPHONE (OUTPATIENT)
Dept: FAMILY MEDICINE CLINIC | Facility: CLINIC | Age: 57
End: 2022-01-03

## 2022-02-04 ENCOUNTER — CONSULT (OUTPATIENT)
Dept: GASTROENTEROLOGY | Facility: AMBULARY SURGERY CENTER | Age: 57
End: 2022-02-04
Payer: COMMERCIAL

## 2022-02-04 ENCOUNTER — RA CDI HCC (OUTPATIENT)
Dept: OTHER | Facility: HOSPITAL | Age: 57
End: 2022-02-04

## 2022-02-04 VITALS
DIASTOLIC BLOOD PRESSURE: 88 MMHG | HEIGHT: 64 IN | HEART RATE: 75 BPM | SYSTOLIC BLOOD PRESSURE: 142 MMHG | OXYGEN SATURATION: 99 % | WEIGHT: 208 LBS | BODY MASS INDEX: 35.51 KG/M2

## 2022-02-04 DIAGNOSIS — Z12.11 COLON CANCER SCREENING: Primary | ICD-10-CM

## 2022-02-04 PROCEDURE — 99244 OFF/OP CNSLTJ NEW/EST MOD 40: CPT | Performed by: INTERNAL MEDICINE

## 2022-02-04 RX ORDER — AMOXICILLIN 500 MG/1
TABLET, FILM COATED ORAL
COMMUNITY
Start: 2021-11-06 | End: 2022-02-17

## 2022-02-04 RX ORDER — CLONIDINE 0.2 MG/24H
PATCH, EXTENDED RELEASE TRANSDERMAL AS NEEDED
COMMUNITY
Start: 2022-01-11

## 2022-02-04 RX ORDER — TRAMADOL HYDROCHLORIDE 100 MG/1
CAPSULE ORAL
COMMUNITY
Start: 2021-10-30

## 2022-02-04 RX ORDER — MELOXICAM 15 MG/1
TABLET ORAL
COMMUNITY
Start: 2022-01-20

## 2022-02-04 RX ORDER — SODIUM PICOSULFATE, MAGNESIUM OXIDE, AND ANHYDROUS CITRIC ACID 10; 3.5; 12 MG/160ML; G/160ML; G/160ML
LIQUID ORAL
Qty: 320 ML | Refills: 0 | Status: SHIPPED | OUTPATIENT
Start: 2022-02-04 | End: 2022-02-17

## 2022-02-04 NOTE — H&P (VIEW-ONLY)
Consultation - [facility]  Osbaldo Cárdenas 62 y o  female MRN: [id number]  Unit/Bed#:  Encounter: [id number]        Consults    ASSESSMENT/PLAN:       1  Colon cancer screening-average risk, no family history of colon cancer  No previous colonoscopy  -will schedule for average risk screening colonoscopy at this time  - Emphasized the importance of adequate bowel prep     -Sent over the prescription for clenpiq prep and instructions were given in the office     - Patient was explained about  the risks and benefits of the procedure  Risks including but not limited to bleeding, infection, perforation were explained in detail  Also explained about less than 100% sensitivity with the exam and other alternatives  ______________________________________________________________________    Reason for Consult / Principal Problem: [unfilled]    HPI: Osbaldo Cárdenas is a 62y o  year old female with history of thoracic outlet syndrome, nerve entrapment syndrome, remote history of a DVT, was previously on anticoagulation, has not been on any anticoagulation recently presents for colon cancer screening evaluation  Patient denies any family history of colon cancer  She denies any change in bowel habits, hematochezia or abdominal pain  She does report history of hemorrhoids  She denies any symptoms of acid reflux, dysphagia, odynophagia, loss of appetite or early satiety  No hematemesis, coffee-ground emesis or melena  She does take meloxicam on as-needed basis  No lower extremity edema, shortness of breath or chest pain  Labs are reviewed, liver enzymes are normal on most recent labs, hemoglobin 13 9, platelets 494  Review of Systems: The remainder of the review of systems was negative except for the pertinent positives noted in HPI       Historical Information   Past Medical History:   Diagnosis Date    Chest pain     last assessed 07/08/13    Endometriosis     last assessed 06/10/14    Thoracic outlet syndrome      Past Surgical History:   Procedure Laterality Date    ANKLE FRACTURE SURGERY      ANKLE SURGERY      BREAST SURGERY Left     HERNIA REPAIR      INCISION AND DRAINAGE ANTERIOR THORAX      RESECTION RIBS EXTRAPLEURAL      SHOULDER ARTHROSCOPY Right     UMBILICAL HERNIA REPAIR       Social History   Social History     Substance and Sexual Activity   Alcohol Use No     Social History     Substance and Sexual Activity   Drug Use Not on file     Social History     Tobacco Use   Smoking Status Never Smoker   Smokeless Tobacco Never Used     Family History   Problem Relation Age of Onset    Diabetes Father     Thyroid disease Sister     Thyroid disease Maternal Aunt     Breast cancer Maternal Aunt     Stroke Maternal Grandmother     Thyroid disease Cousin     Thyroid cancer Cousin     Heart disease Neg Hx        Meds/Allergies     (Not in a hospital admission)    No current facility-administered medications for this visit  Allergies   Allergen Reactions    Alcohol - Food Allergy Nausea Only       Objective     Blood pressure 142/88, pulse 75, height 5' 4" (1 626 m), weight 94 3 kg (208 lb), SpO2 99 %  [unfilled]    PHYSICAL EXAM     GEN: well nourished, well developed, no acute distress  HEENT: anicteric, MMM  CV: RRR, no m/r/g  CHEST: CTA b/l, no WRR  ABD: +BS, soft, NT/ND, no hepatosplenomegaly  EXT: no c/c/e  SKIN: no rashes,  NEURO: aaox3    Lab Results:   No visits with results within 1 Day(s) from this visit     Latest known visit with results is:   Appointment on 06/11/2021   Component Date Value    WBC 06/11/2021 6 53     RBC 06/11/2021 4 75     Hemoglobin 06/11/2021 13 9     Hematocrit 06/11/2021 43 0     MCV 06/11/2021 91     MCH 06/11/2021 29 3     MCHC 06/11/2021 32 3     RDW 06/11/2021 12 5     Platelets 91/71/5856 260     MPV 06/11/2021 9 6     TSH 3RD GENERATON 06/11/2021 1 740     Vit D, 25-Hydroxy 06/11/2021 22 9*    Iron 06/11/2021 79     Ferritin 06/11/2021 51     Insulin, Fasting 06/11/2021 9 8     Cortisol - AM 06/11/2021 9 2      Imaging Studies: I have personally reviewed pertinent films in PACS

## 2022-02-04 NOTE — PROGRESS NOTES
Consultation - [address] [facility]  Santiago Simmons 62 y o  female MRN: [id number]  Unit/Bed#:  Encounter: [id number]        Consults    ASSESSMENT/PLAN:       1  Colon cancer screening-average risk, no family history of colon cancer  No previous colonoscopy  -will schedule for average risk screening colonoscopy at this time  - Emphasized the importance of adequate bowel prep     -Sent over the prescription for clenpiq prep and instructions were given in the office     - Patient was explained about  the risks and benefits of the procedure  Risks including but not limited to bleeding, infection, perforation were explained in detail  Also explained about less than 100% sensitivity with the exam and other alternatives  ______________________________________________________________________    Reason for Consult / Principal Problem: [unfilled]    HPI: Santiago Simmons is a 62y o  year old female with history of thoracic outlet syndrome, nerve entrapment syndrome, remote history of a DVT, was previously on anticoagulation, has not been on any anticoagulation recently presents for colon cancer screening evaluation  Patient denies any family history of colon cancer  She denies any change in bowel habits, hematochezia or abdominal pain  She does report history of hemorrhoids  She denies any symptoms of acid reflux, dysphagia, odynophagia, loss of appetite or early satiety  No hematemesis, coffee-ground emesis or melena  She does take meloxicam on as-needed basis  No lower extremity edema, shortness of breath or chest pain  Labs are reviewed, liver enzymes are normal on most recent labs, hemoglobin 13 9, platelets 938  Review of Systems: The remainder of the review of systems was negative except for the pertinent positives noted in HPI       Historical Information   Past Medical History:   Diagnosis Date    Chest pain     last assessed 07/08/13    Endometriosis     last assessed 06/10/14    Thoracic outlet syndrome      Past Surgical History:   Procedure Laterality Date    ANKLE FRACTURE SURGERY      ANKLE SURGERY      BREAST SURGERY Left     HERNIA REPAIR      INCISION AND DRAINAGE ANTERIOR THORAX      RESECTION RIBS EXTRAPLEURAL      SHOULDER ARTHROSCOPY Right     UMBILICAL HERNIA REPAIR       Social History   Social History     Substance and Sexual Activity   Alcohol Use No     Social History     Substance and Sexual Activity   Drug Use Not on file     Social History     Tobacco Use   Smoking Status Never Smoker   Smokeless Tobacco Never Used     Family History   Problem Relation Age of Onset    Diabetes Father     Thyroid disease Sister     Thyroid disease Maternal Aunt     Breast cancer Maternal Aunt     Stroke Maternal Grandmother     Thyroid disease Cousin     Thyroid cancer Cousin     Heart disease Neg Hx        Meds/Allergies     (Not in a hospital admission)    No current facility-administered medications for this visit  Allergies   Allergen Reactions    Alcohol - Food Allergy Nausea Only       Objective     Blood pressure 142/88, pulse 75, height 5' 4" (1 626 m), weight 94 3 kg (208 lb), SpO2 99 %  [unfilled]    PHYSICAL EXAM     GEN: well nourished, well developed, no acute distress  HEENT: anicteric, MMM  CV: RRR, no m/r/g  CHEST: CTA b/l, no WRR  ABD: +BS, soft, NT/ND, no hepatosplenomegaly  EXT: no c/c/e  SKIN: no rashes,  NEURO: aaox3    Lab Results:   No visits with results within 1 Day(s) from this visit     Latest known visit with results is:   Appointment on 06/11/2021   Component Date Value    WBC 06/11/2021 6 53     RBC 06/11/2021 4 75     Hemoglobin 06/11/2021 13 9     Hematocrit 06/11/2021 43 0     MCV 06/11/2021 91     MCH 06/11/2021 29 3     MCHC 06/11/2021 32 3     RDW 06/11/2021 12 5     Platelets 60/94/8733 260     MPV 06/11/2021 9 6     TSH 3RD GENERATON 06/11/2021 1 740     Vit D, 25-Hydroxy 06/11/2021 22 9*    Iron 06/11/2021 79     Ferritin 06/11/2021 51     Insulin, Fasting 06/11/2021 9 8     Cortisol - AM 06/11/2021 9 2      Imaging Studies: I have personally reviewed pertinent films in PACS

## 2022-02-04 NOTE — LETTER
February 4, 2022     Referral [address] [place] [id number]    Patient: Raz Galindo   YOB: 1965   Date of Visit: 2/4/2022       Dear Dr Nanette Valdivia:    Thank you for referring Mary Wolf to me for evaluation  Below are my notes for this consultation  If you have questions, please do not hesitate to call me  I look forward to following your patient along with you  Sincerely,        Krystal Savage MD        CC: Vilma Liz, Fide Hargrove MD  2/4/2022  2:52 PM  Sign when Signing Visit  Consultation - [address] [facility]  Raz Galindo 62 y o  female MRN: [id number]  Unit/Bed#:  Encounter: [id number]        Consults    ASSESSMENT/PLAN:       1  Colon cancer screening-average risk, no family history of colon cancer  No previous colonoscopy  -will schedule for average risk screening colonoscopy at this time  - Emphasized the importance of adequate bowel prep     -Sent over the prescription for clenpiq prep and instructions were given in the office     - Patient was explained about  the risks and benefits of the procedure  Risks including but not limited to bleeding, infection, perforation were explained in detail  Also explained about less than 100% sensitivity with the exam and other alternatives  ______________________________________________________________________    Reason for Consult / Principal Problem: [unfilled]    HPI: Raz Galindo is a 62y o  year old female with history of thoracic outlet syndrome, nerve entrapment syndrome, remote history of a DVT, was previously on anticoagulation, has not been on any anticoagulation recently presents for colon cancer screening evaluation  Patient denies any family history of colon cancer  She denies any change in bowel habits, hematochezia or abdominal pain  She does report history of hemorrhoids    She denies any symptoms of acid reflux, dysphagia, odynophagia, loss of appetite or early satiety  No hematemesis, coffee-ground emesis or melena  She does take meloxicam on as-needed basis  No lower extremity edema, shortness of breath or chest pain  Labs are reviewed, liver enzymes are normal on most recent labs, hemoglobin 13 9, platelets 170  Review of Systems: The remainder of the review of systems was negative except for the pertinent positives noted in HPI  Historical Information   Past Medical History:   Diagnosis Date    Chest pain     last assessed 07/08/13    Endometriosis     last assessed 06/10/14    Thoracic outlet syndrome      Past Surgical History:   Procedure Laterality Date    ANKLE FRACTURE SURGERY      ANKLE SURGERY      BREAST SURGERY Left     HERNIA REPAIR      INCISION AND DRAINAGE ANTERIOR THORAX      RESECTION RIBS EXTRAPLEURAL      SHOULDER ARTHROSCOPY Right     UMBILICAL HERNIA REPAIR       Social History   Social History     Substance and Sexual Activity   Alcohol Use No     Social History     Substance and Sexual Activity   Drug Use Not on file     Social History     Tobacco Use   Smoking Status Never Smoker   Smokeless Tobacco Never Used     Family History   Problem Relation Age of Onset    Diabetes Father     Thyroid disease Sister     Thyroid disease Maternal Aunt     Breast cancer Maternal Aunt     Stroke Maternal Grandmother     Thyroid disease Cousin     Thyroid cancer Cousin     Heart disease Neg Hx        Meds/Allergies     (Not in a hospital admission)    No current facility-administered medications for this visit  Allergies   Allergen Reactions    Alcohol - Food Allergy Nausea Only       Objective     Blood pressure 142/88, pulse 75, height 5' 4" (1 626 m), weight 94 3 kg (208 lb), SpO2 99 %      [unfilled]    PHYSICAL EXAM     GEN: well nourished, well developed, no acute distress  HEENT: anicteric, MMM  CV: RRR, no m/r/g  CHEST: CTA b/l, no WRR  ABD: +BS, soft, NT/ND, no hepatosplenomegaly  EXT: no c/c/e  SKIN: no minh,  NEURO: aaox3    Lab Results:   No visits with results within 1 Day(s) from this visit     Latest known visit with results is:   Appointment on 06/11/2021   Component Date Value    WBC 06/11/2021 6 53     RBC 06/11/2021 4 75     Hemoglobin 06/11/2021 13 9     Hematocrit 06/11/2021 43 0     MCV 06/11/2021 91     MCH 06/11/2021 29 3     MCHC 06/11/2021 32 3     RDW 06/11/2021 12 5     Platelets 79/78/2666 260     MPV 06/11/2021 9 6     TSH 3RD GENERATON 06/11/2021 1 740     Vit D, 25-Hydroxy 06/11/2021 22 9*    Iron 06/11/2021 79     Ferritin 06/11/2021 51     Insulin, Fasting 06/11/2021 9 8     Cortisol - AM 06/11/2021 9 2      Imaging Studies: I have personally reviewed pertinent films in PACS

## 2022-02-04 NOTE — PATIENT INSTRUCTIONS
Scheduled date of colonoscopy (as of today):2/23/2022  Physician performing colonoscopy:DR BLAKELY  Location of colonoscopy:[facility]  Bowel prep reviewed with patient:CLENPIQ PER DR SEGOVIA  Instructions reviewed with patient by:MARYAN GUNTER  Clearances: PT VACCINATED

## 2022-02-04 NOTE — PROGRESS NOTES
[facility] 75  coding opportunities       Chart reviewed, no opportunity found: CHART REVIEWED, NO OPPORTUNITY FOUND                        Patients insurance company: [employer] (Medicare Advantage and Commercial)

## 2022-02-08 ENCOUNTER — TELEPHONE (OUTPATIENT)
Dept: FAMILY MEDICINE CLINIC | Facility: CLINIC | Age: 57
End: 2022-02-08

## 2022-02-08 ENCOUNTER — TELEPHONE (OUTPATIENT)
Dept: ADMINISTRATIVE | Facility: OTHER | Age: 57
End: 2022-02-08

## 2022-02-08 ENCOUNTER — OFFICE VISIT (OUTPATIENT)
Dept: FAMILY MEDICINE CLINIC | Facility: CLINIC | Age: 57
End: 2022-02-08
Payer: COMMERCIAL

## 2022-02-08 VITALS
WEIGHT: 203.6 LBS | HEIGHT: 64 IN | OXYGEN SATURATION: 99 % | RESPIRATION RATE: 13 BRPM | HEART RATE: 85 BPM | SYSTOLIC BLOOD PRESSURE: 148 MMHG | BODY MASS INDEX: 34.76 KG/M2 | DIASTOLIC BLOOD PRESSURE: 90 MMHG | TEMPERATURE: 97.3 F

## 2022-02-08 DIAGNOSIS — Z00.00 ANNUAL PHYSICAL EXAM: Primary | ICD-10-CM

## 2022-02-08 DIAGNOSIS — E55.9 VITAMIN D DEFICIENCY: ICD-10-CM

## 2022-02-08 DIAGNOSIS — Z13.220 SCREENING, LIPID: ICD-10-CM

## 2022-02-08 DIAGNOSIS — F11.20 CONTINUOUS OPIOID DEPENDENCE (HCC): ICD-10-CM

## 2022-02-08 DIAGNOSIS — M19.90 INFLAMMATORY ARTHROPATHY: ICD-10-CM

## 2022-02-08 DIAGNOSIS — Z13.1 SCREENING FOR DIABETES MELLITUS: ICD-10-CM

## 2022-02-08 PROCEDURE — 1036F TOBACCO NON-USER: CPT | Performed by: FAMILY MEDICINE

## 2022-02-08 PROCEDURE — 3725F SCREEN DEPRESSION PERFORMED: CPT | Performed by: FAMILY MEDICINE

## 2022-02-08 PROCEDURE — 3008F BODY MASS INDEX DOCD: CPT | Performed by: FAMILY MEDICINE

## 2022-02-08 PROCEDURE — 99396 PREV VISIT EST AGE 40-64: CPT | Performed by: FAMILY MEDICINE

## 2022-02-08 NOTE — PROGRESS NOTES
[address] [facility] [address]    NAME: Lyle Ho  AGE: 62 y o  SEX: female  : 1965     DATE: 2022     Assessment and Plan:     Problem List Items Addressed This Visit        Musculoskeletal and Integument    Inflammatory arthropathy    Relevant Orders    TSH, 3rd generation with Free T4 reflex       Other    Annual physical exam - Primary     Labs ordered  Thinks she had tdap         Screening, lipid    Relevant Orders    Lipid Panel with Direct LDL reflex    Vitamin D deficiency    Relevant Orders    Vitamin D 25 hydroxy    Screening for diabetes mellitus    Relevant Orders    Comprehensive metabolic panel    Continuous opioid dependence ([address] )     Takes tramadil as needed for pain               Immunizations and preventive care screenings were discussed with patient today  Appropriate education was printed on patient's after visit summary  Counseling:  · Dental Health: discussed importance of regular tooth brushing, flossing, and dental visits  BMI Counseling: Body mass index is 34 76 kg/m²  The BMI is above normal  Nutrition recommendations include encouraging healthy choices of fruits and vegetables  Exercise recommendations include vigorous physical activity 75 minutes/week  No pharmacotherapy was ordered  Rationale for BMI follow-up plan is due to patient being overweight or obese  Depression Screening and Follow-up Plan: Patient was screened for depression during today's encounter  They screened negative with a PHQ-2 score of 0  Return in 1 year (on 2023)  Chief Complaint:     Chief Complaint   Patient presents with    Annual Exam     Patient here for annual wellness exam      History of Present Illness:     Adult Annual Physical   Patient here for a comprehensive physical exam  The patient reports no problems  Diet and Physical Activity  · Diet/Nutrition: well balanced diet  · Exercise: walking  Depression Screening  PHQ-2/9 Depression Screening    Little interest or pleasure in doing things: 0 - not at all  Feeling down, depressed, or hopeless: 0 - not at all  PHQ-2 Score: 0  PHQ-2 Interpretation: Negative depression screen       General Health  · Sleep: sleeps well  · Hearing: normal - bilateral   · Vision: wears glasses  · Dental: regular dental visits  /GYN Health  · Patient is: postmenopausal  · Last menstrual period: n/a  · Contraceptive method: n/a       Review of Systems:     Review of Systems   Past Medical History:     Past Medical History:   Diagnosis Date    Chest pain     last assessed 07/08/13    Endometriosis     last assessed 06/10/14    Thoracic outlet syndrome       Past Surgical History:     Past Surgical History:   Procedure Laterality Date    ANKLE FRACTURE SURGERY      ANKLE SURGERY      BREAST SURGERY Left     HERNIA REPAIR      INCISION AND DRAINAGE ANTERIOR THORAX      RESECTION RIBS EXTRAPLEURAL      SHOULDER ARTHROSCOPY Right     UMBILICAL HERNIA REPAIR        Social History:     Social History     Socioeconomic History    Marital status: /Civil Union     Spouse name: None    Number of children: None    Years of education: None    Highest education level: None   Occupational History    None   Tobacco Use    Smoking status: Never Smoker    Smokeless tobacco: Never Used   Substance and Sexual Activity    Alcohol use: No    Drug use: None    Sexual activity: None   Other Topics Concern    None   Social History Narrative    None     Social Determinants of Health     Financial Resource Strain: Not on file   Food Insecurity: Not on file   Transportation Needs: Not on file   Physical Activity: Not on file   Stress: Not on file   Social Connections: Not on file   Intimate Partner Violence: Not on file   Housing Stability: Not on file      Family History:     Family History   Problem Relation Age of Onset    Diabetes Father     Thyroid disease Sister     Thyroid disease Maternal Aunt     Breast cancer Maternal Aunt     Stroke Maternal Grandmother     Thyroid disease Cousin     Thyroid cancer Cousin     Heart disease Neg Hx       Current Medications:     Current Outpatient Medications   Medication Sig Dispense Refill    Ascorbic Acid (VITAMIN C PO) Take by mouth      celecoxib (CeleBREX) 200 mg capsule as needed       cloNIDine (CATAPRES-TTS-2) 0 2 mg/24 hr APPLY 1 PATCH TOPICALLY TO THE SKIN 1 TIME A WEEK      lidocaine (XYLOCAINE) 5 % ointment APPLY TO AFFECTED AREAS AS DIRECTED  1    meloxicam (MOBIC) 15 mg tablet       Multiple Vitamins-Minerals (multivitamin with minerals) tablet Take 1 tablet by mouth daily      traMADol HCl  MG CP24 TAKE 1 TO 2 CAPSULES BY MOUTH AT BEDTIME AS NEEDED      VITAMIN D PO Take by mouth      amoxicillin (AMOXIL) 500 MG tablet  (Patient not taking: Reported on 2/4/2022 )      Sod Picosulfate-Mag Ox-Cit Acd (Clenpiq) 10-3 5-12 MG-GM -GM/160ML SOLN Follow instructions as given during the office  (Patient not taking: Reported on 2/8/2022 ) 320 mL 0     No current facility-administered medications for this visit  Allergies: Allergies   Allergen Reactions    Alcohol - Food Allergy Nausea Only      Physical Exam:     /90 (BP Location: Left arm, Patient Position: Sitting, Cuff Size: Standard)   Pulse 85   Temp (!) 97 3 °F (36 3 °C)   Resp 13   Ht 5' 4 17" (1 63 m)   Wt 92 4 kg (203 lb 9 6 oz)   SpO2 99%   BMI 34 76 kg/m²     Physical Exam  Vitals and nursing note reviewed  Constitutional:       Appearance: Normal appearance  She is well-developed  HENT:      Head: Normocephalic and atraumatic  Right Ear: Tympanic membrane, ear canal and external ear normal       Left Ear: Tympanic membrane, ear canal and external ear normal       Nose: Nose normal    Eyes:      Extraocular Movements: Extraocular movements intact        Conjunctiva/sclera: Conjunctivae normal       Pupils: Pupils are equal, round, and reactive to light  Cardiovascular:      Rate and Rhythm: Normal rate and regular rhythm  Heart sounds: Normal heart sounds  Pulmonary:      Effort: Pulmonary effort is normal       Breath sounds: Normal breath sounds  Abdominal:      General: Abdomen is flat  Bowel sounds are normal       Palpations: Abdomen is soft  Musculoskeletal:         General: Normal range of motion  Cervical back: Normal range of motion and neck supple  Skin:     General: Skin is warm and dry  Capillary Refill: Capillary refill takes less than 2 seconds  Neurological:      General: No focal deficit present  Mental Status: She is alert and oriented to person, place, and time  Psychiatric:         Mood and Affect: Mood normal          Behavior: Behavior normal          Thought Content:  Thought content normal          Judgment: Judgment normal           Trudi Stairs, DO  [facility]

## 2022-02-08 NOTE — TELEPHONE ENCOUNTER
Upon review of the In Basket request we were able to locate, review, and update the patient chart as requested for Pap Smear (HPV) aka Cervical Cancer Screening  Any additional questions or concerns should be emailed to the Practice Liaisons via [email or web address] email, please do not reply via In Basket      Thank you  Bhavana Sheikh MA

## 2022-02-08 NOTE — TELEPHONE ENCOUNTER
----- Message from Arsenio Taylor sent at 2/7/2022  1:40 PM EST -----  Regarding: Pap Test  02/07/22 1:41 PM    Hello, our patient Kristopher Turner has had Pap Test completed/performed  Please assist in updating the patient chart by Care Every where under over view The date of service is 01/20/2022       Thank you,  Arsenio [facility]

## 2022-02-08 NOTE — TELEPHONE ENCOUNTER
----- Message from Shira Tate sent at 2/7/2022  1:40 PM EST -----  Regarding: Pap Test  02/07/22 1:41 PM    Hello, our patient Vimal Mcdonnell has had Pap Test completed/performed  Please assist in updating the patient chart by Care Every where under over view The date of service is 01/20/2022       Thank you,  Shira Tate  [facility]

## 2022-02-08 NOTE — TELEPHONE ENCOUNTER
At checkout patient stated that she forgot to ask you if she should be getting scanned because both of her sisters have osteoporosis  Please advise

## 2022-02-08 NOTE — PATIENT INSTRUCTIONS

## 2022-02-17 ENCOUNTER — TELEPHONE (OUTPATIENT)
Dept: PREADMISSION TESTING | Facility: HOSPITAL | Age: 57
End: 2022-02-17

## 2022-02-17 ENCOUNTER — TELEPHONE (OUTPATIENT)
Dept: FAMILY MEDICINE CLINIC | Facility: CLINIC | Age: 57
End: 2022-02-17

## 2022-02-17 VITALS — BODY MASS INDEX: 34.66 KG/M2 | WEIGHT: 203 LBS | HEIGHT: 64 IN

## 2022-02-17 RX ORDER — ASPIRIN 81 MG/1
81 TABLET ORAL AS NEEDED
COMMUNITY

## 2022-02-17 RX ORDER — ZINC GLUCONATE 50 MG
50 TABLET ORAL AS NEEDED
COMMUNITY

## 2022-02-17 NOTE — TELEPHONE ENCOUNTER
Patient called stating insurance will cover DXA bone density as long as it is coded preventative  Is this something you are able to change?     Please advise

## 2022-02-17 NOTE — PRE-PROCEDURE INSTRUCTIONS
Pre-Surgery Instructions:   Medication Instructions    Ascorbic Acid (VITAMIN C PO) Instructed patient per Anesthesia Guidelines   aspirin (ECOTRIN LOW STRENGTH) 81 mg EC tablet Instructed patient per Anesthesia Guidelines   celecoxib (CeleBREX) 200 mg capsule Instructed patient per Anesthesia Guidelines   cloNIDine (CATAPRES-TTS-2) 0 2 mg/24 hr Instructed patient per Anesthesia Guidelines   lidocaine (XYLOCAINE) 5 % ointment Instructed patient per Anesthesia Guidelines   magnesium citrate solution Patient was instructed by Physician and understands   meloxicam (MOBIC) 15 mg tablet Instructed patient per Anesthesia Guidelines   Multiple Vitamins-Minerals (multivitamin with minerals) tablet stop as of 2/17/22    Polyethylene Glycol 3350 (MIRALAX PO) Patient was instructed by Physician and understands   traMADol HCl  MG CP24 Instructed patient per Anesthesia Guidelines   VITAMIN D PO Instructed patient per Anesthesia Guidelines  Pt to follow Dr Pat Gonzalez instructions   unknown at time of GI interview

## 2022-02-23 ENCOUNTER — ANESTHESIA (OUTPATIENT)
Dept: GASTROENTEROLOGY | Facility: AMBULARY SURGERY CENTER | Age: 57
End: 2022-02-23

## 2022-02-23 ENCOUNTER — HOSPITAL ENCOUNTER (OUTPATIENT)
Dept: GASTROENTEROLOGY | Facility: AMBULARY SURGERY CENTER | Age: 57
Setting detail: OUTPATIENT SURGERY
Discharge: HOME | End: 2022-02-23
Attending: INTERNAL MEDICINE | Admitting: INTERNAL MEDICINE
Payer: COMMERCIAL

## 2022-02-23 ENCOUNTER — ANESTHESIA EVENT (OUTPATIENT)
Dept: GASTROENTEROLOGY | Facility: AMBULARY SURGERY CENTER | Age: 57
End: 2022-02-23

## 2022-02-23 VITALS
TEMPERATURE: 96.6 F | BODY MASS INDEX: 34.66 KG/M2 | HEART RATE: 78 BPM | RESPIRATION RATE: 18 BRPM | WEIGHT: 203 LBS | SYSTOLIC BLOOD PRESSURE: 131 MMHG | DIASTOLIC BLOOD PRESSURE: 81 MMHG | HEIGHT: 64 IN | OXYGEN SATURATION: 95 %

## 2022-02-23 DIAGNOSIS — Z12.11 COLON CANCER SCREENING: ICD-10-CM

## 2022-02-23 PROCEDURE — 88305 TISSUE EXAM BY PATHOLOGIST: CPT | Performed by: PATHOLOGY

## 2022-02-23 PROCEDURE — 45380 COLONOSCOPY AND BIOPSY: CPT | Performed by: INTERNAL MEDICINE

## 2022-02-23 RX ORDER — SODIUM CHLORIDE, SODIUM LACTATE, POTASSIUM CHLORIDE, CALCIUM CHLORIDE 600; 310; 30; 20 MG/100ML; MG/100ML; MG/100ML; MG/100ML
125 INJECTION, SOLUTION INTRAVENOUS CONTINUOUS
Status: DISCONTINUED | OUTPATIENT
Start: 2022-02-23 | End: 2022-02-27 | Stop reason: HOSPADM

## 2022-02-23 RX ORDER — SODIUM CHLORIDE, SODIUM LACTATE, POTASSIUM CHLORIDE, CALCIUM CHLORIDE 600; 310; 30; 20 MG/100ML; MG/100ML; MG/100ML; MG/100ML
125 INJECTION, SOLUTION INTRAVENOUS CONTINUOUS
Status: CANCELLED | OUTPATIENT
Start: 2022-02-23

## 2022-02-23 RX ORDER — PROPOFOL 10 MG/ML
INJECTION, EMULSION INTRAVENOUS AS NEEDED
Status: DISCONTINUED | OUTPATIENT
Start: 2022-02-23 | End: 2022-02-23

## 2022-02-23 RX ORDER — PROPOFOL 10 MG/ML
INJECTION, EMULSION INTRAVENOUS CONTINUOUS PRN
Status: DISCONTINUED | OUTPATIENT
Start: 2022-02-23 | End: 2022-02-23

## 2022-02-23 RX ORDER — SODIUM CHLORIDE, SODIUM LACTATE, POTASSIUM CHLORIDE, CALCIUM CHLORIDE 600; 310; 30; 20 MG/100ML; MG/100ML; MG/100ML; MG/100ML
INJECTION, SOLUTION INTRAVENOUS CONTINUOUS PRN
Status: DISCONTINUED | OUTPATIENT
Start: 2022-02-23 | End: 2022-02-23

## 2022-02-23 RX ORDER — LIDOCAINE HYDROCHLORIDE 10 MG/ML
INJECTION, SOLUTION EPIDURAL; INFILTRATION; INTRACAUDAL; PERINEURAL AS NEEDED
Status: DISCONTINUED | OUTPATIENT
Start: 2022-02-23 | End: 2022-02-23

## 2022-02-23 RX ADMIN — LIDOCAINE HYDROCHLORIDE 50 MG: 10 INJECTION, SOLUTION EPIDURAL; INFILTRATION; INTRACAUDAL; PERINEURAL at 12:21

## 2022-02-23 RX ADMIN — PROPOFOL 100 MCG/KG/MIN: 10 INJECTION, EMULSION INTRAVENOUS at 12:21

## 2022-02-23 RX ADMIN — SODIUM CHLORIDE, SODIUM LACTATE, POTASSIUM CHLORIDE, AND CALCIUM CHLORIDE 125 ML/HR: .6; .31; .03; .02 INJECTION, SOLUTION INTRAVENOUS at 12:15

## 2022-02-23 RX ADMIN — PROPOFOL 80 MG: 10 INJECTION, EMULSION INTRAVENOUS at 12:21

## 2022-02-23 RX ADMIN — SODIUM CHLORIDE, SODIUM LACTATE, POTASSIUM CHLORIDE, AND CALCIUM CHLORIDE: .6; .31; .03; .02 INJECTION, SOLUTION INTRAVENOUS at 12:16

## 2022-02-23 NOTE — ANESTHESIA POSTPROCEDURE EVALUATION
Post-Op Assessment Note    CV Status:  Stable  Pain Score: 0    Pain management: adequate     Mental Status:  Awake   Hydration Status:  Stable and euvolemic   PONV Controlled:  None   Airway Patency:  Patent and inadequate      Post Op Vitals Reviewed: Yes      Staff: CRNA         No complications documented      BP   121/75   Temp     Pulse 70   Resp 19   SpO2 99

## 2022-02-23 NOTE — INTERVAL H&P NOTE
H&P reviewed  After examining the patient I find no changes in the patients condition since the H&P had been written      Vitals:    02/23/22 1145   BP: 144/86   Pulse: 93   Resp: 18   Temp: (!) 96 6 °F (35 9 °C)   SpO2: 95%

## 2022-02-23 NOTE — ANESTHESIA PREPROCEDURE EVALUATION
Procedure:  COLONOSCOPY    Relevant Problems   MUSCULOSKELETAL   (+) Inflammatory arthropathy      NEURO/PSYCH   (+) Chronic foot pain, left   (+) Continuous opioid dependence (HCC)        Physical Exam    Airway    Mallampati score: II  TM Distance: >3 FB  Neck ROM: full     Dental   No notable dental hx     Cardiovascular      Pulmonary      Other Findings        Anesthesia Plan  ASA Score- 2     Anesthesia Type- IV sedation with anesthesia with ASA Monitors  Additional Monitors:   Airway Plan:           Plan Factors-Exercise tolerance (METS): >4 METS  Chart reviewed  Existing labs reviewed  Patient summary reviewed  Patient is not a current smoker  Induction- intravenous  Postoperative Plan- Plan for postoperative opioid use  Informed Consent- Anesthetic plan and risks discussed with patient  I personally reviewed this patient with the CRNA  Discussed and agreed on the Anesthesia Plan with the CRNA  Taylor Mcbride

## 2022-08-12 ENCOUNTER — APPOINTMENT (OUTPATIENT)
Dept: LAB | Facility: MEDICAL CENTER | Age: 57
End: 2022-08-12

## 2022-10-12 PROBLEM — Z12.11 COLON CANCER SCREENING: Status: RESOLVED | Noted: 2019-12-23 | Resolved: 2022-10-12

## 2022-10-12 PROBLEM — Z13.1 SCREENING FOR DIABETES MELLITUS: Status: RESOLVED | Noted: 2022-02-08 | Resolved: 2022-10-12

## 2022-12-23 ENCOUNTER — OFFICE VISIT (OUTPATIENT)
Dept: GASTROENTEROLOGY | Facility: AMBULARY SURGERY CENTER | Age: 57
End: 2022-12-23

## 2022-12-23 VITALS
OXYGEN SATURATION: 100 % | HEART RATE: 76 BPM | SYSTOLIC BLOOD PRESSURE: 132 MMHG | WEIGHT: 200 LBS | BODY MASS INDEX: 34.15 KG/M2 | HEIGHT: 64 IN | RESPIRATION RATE: 18 BRPM | DIASTOLIC BLOOD PRESSURE: 80 MMHG

## 2022-12-23 DIAGNOSIS — R19.5 LOOSE STOOLS: Primary | ICD-10-CM

## 2022-12-23 DIAGNOSIS — K62.5 RECTAL BLEEDING: ICD-10-CM

## 2022-12-23 NOTE — LETTER
December 23, 2022     Radha Chan, [facility]  [address] [address]  [address] [address]    Patient: Jessica Thompson   YOB: 1965   Date of Visit: 12/23/2022       Dear Dr Jacquelyn Wilburn:    Thank you for referring Miguel Andrea to me for evaluation  Below are my notes for this consultation  If you have questions, please do not hesitate to call me  I look forward to following your patient along with you  Sincerely,        Rajan Celestin MD        CC: No Recipients  Kim Ortez DO  12/23/2022  9:28 AM  Sign when Signing Visit  [facility]  Progress Note - Jessica Thompson 62 y o  female MRN: [id number]    Unit/Bed#:  Encounter: [id number]    Assessment/Plan:    1  Hematochezia-likely hemorrhoidal as noted to have internal hemorrhoids on colonoscopy  Patient has tried over-the-counter pulsatory without any relief  Denies constipation  Denies bearing down or straining   -Would recommend trial of sitz bath  -Given no improvement with over-the-counter suppositories, may benefit from evaluation for hemorrhoidal banding  2   Change in bowel habits with more frequent bowel movements and abdominal discomfort-possibly IBS however will check celiac serologies and thyroid function  We will also check stool studies to rule out any infectious process  Check CBC  -Would recommend starting on a low FODMAP diet  -May continue probiotics however patient has not noted any significant improvement-can consider IBgard   -If above work-up is negative, may benefit from Xifaxan  3   Sessile serrated adenomas-repeat colonoscopy at 5-year interval       Subjective:   77-year-old female with history of inflammatory arthropathy, idiopathic urticaria, presents for evaluation of change in bowel habits and rectal bleeding since having had her colonoscopy      Colonoscopy earlier this year was notable for diverticulosis, lipoma, several polyps, 2 of these polyps were sessile serrated adenomas and one was hyperplastic  Patient was recommended repeat colonoscopy at 5-year interval     Now presents with concerns of bright red blood per rectum, increasing in frequency since her coloscopy in February  She reports following the procedure she was having about 1 episode per month of blood in the toilet bowl with BM  This has since become more frequent, occurring almost once per week and had passed a clot in her stool earlier this month  She reports no melena  Her stools are soft, loose  She denies constipation/diarrhea  She denies change In her dietary habits  She has not started any new medications in the last year  She reports having 1-2 BM per day on average  She reports intermittent, diffuse, abdominal pain, that isn't always associated with the bloody BMs  She denies reflux symptoms, nausea, vomiting, or localized abd pain  She reports none of these symptoms were present prior to her screening colonoscopy in February  She has tried OTC hemorrhoid treatment without relief  She takes a daily probiotic without relief  She denies weight loss  Objective:     Vitals: Blood pressure 132/80, pulse 76, resp  rate 18, height 5' 4" (1 626 m), weight 90 7 kg (200 lb), SpO2 100 %  ,Body mass index is 34 33 kg/m²  [unfilled]    Physical Exam:    GEN: wn/wd, NAD  HEENT: MMM, no cervical or supraclavicular LAD, anciteric  CV: RRR, no m/r/g  CHEST: CTA b/l, no w/r/r  ABD: +BS, soft, NT/ND, no hepatosplenomegaly  EXT: no c/c/e  SKIN: no rashes  NEURO: aaox3      Invasive Devices     None                         Lab, Imaging and other studies:     No visits with results within 1 Day(s) from this visit     Latest known visit with results is:   Transcribe Orders on 08/12/2022   Component Date Value   • Rubeola IgG 08/12/2022 IMMUNE    • Mumps IgG 08/12/2022 IMMUNE    • Rubella IgG Quant 08/12/2022 >175 0    • Varicella IgG 08/12/2022 IMMUNE    • QFT Nil 08/12/2022 0 05    • QFT TB1-NIL 08/12/2022 -0 02    • QFT TB2-NIL 08/12/2022 0 01    • QFT Mitogen-NIL 08/12/2022 7 36    • QFT Final Interpretation 08/12/2022 Negative          I have personally reviewed pertinent films in PACS    No current facility-administered medications for this visit

## 2022-12-23 NOTE — PROGRESS NOTES
[facility]  Progress Note - Rosario Edwards 62 y o  female MRN: [id number]    Unit/Bed#:  Encounter: [id number]    Assessment/Plan:    1  Hematochezia-likely hemorrhoidal as noted to have internal hemorrhoids on colonoscopy  Patient has tried over-the-counter pulsatory without any relief  Denies constipation  Denies bearing down or straining   -Would recommend trial of sitz bath  -Given no improvement with over-the-counter suppositories, may benefit from evaluation for hemorrhoidal banding  2   Change in bowel habits with more frequent bowel movements and abdominal discomfort-possibly IBS however will check celiac serologies and thyroid function  We will also check stool studies to rule out any infectious process  Check CBC  -Would recommend starting on a low FODMAP diet  -May continue probiotics however patient has not noted any significant improvement-can consider IBgard   -If above work-up is negative, may benefit from Xifaxan  3   Sessile serrated adenomas-repeat colonoscopy at 5-year interval       Subjective:   59-year-old female with history of inflammatory arthropathy, idiopathic urticaria, presents for evaluation of change in bowel habits and rectal bleeding since having had her colonoscopy  Colonoscopy earlier this year was notable for diverticulosis, lipoma, several polyps, 2 of these polyps were sessile serrated adenomas and one was hyperplastic  Patient was recommended repeat colonoscopy at 5-year interval     Now presents with concerns of bright red blood per rectum, increasing in frequency since her coloscopy in February  She reports following the procedure she was having about 1 episode per month of blood in the toilet bowl with BM  This has since become more frequent, occurring almost once per week and had passed a clot in her stool earlier this month  She reports no melena  Her stools are soft, loose  She denies constipation/diarrhea   She denies change In her dietary habits  She has not started any new medications in the last year  She reports having 1-2 BM per day on average  She reports intermittent, diffuse, abdominal pain, that isn't always associated with the bloody BMs  She denies reflux symptoms, nausea, vomiting, or localized abd pain  She reports none of these symptoms were present prior to her screening colonoscopy in February  She has tried OTC hemorrhoid treatment without relief  She takes a daily probiotic without relief  She denies weight loss  Objective:     Vitals: Blood pressure 132/80, pulse 76, resp  rate 18, height 5' 4" (1 626 m), weight 90 7 kg (200 lb), SpO2 100 %  ,Body mass index is 34 33 kg/m²  [unfilled]    Physical Exam:    GEN: wn/wd, NAD  HEENT: MMM, no cervical or supraclavicular LAD, anciteric  CV: RRR, no m/r/g  CHEST: CTA b/l, no w/r/r  ABD: +BS, soft, NT/ND, no hepatosplenomegaly  EXT: no c/c/e  SKIN: no rashes  NEURO: aaox3      Invasive Devices     None                         Lab, Imaging and other studies:     No visits with results within 1 Day(s) from this visit  Latest known visit with results is:   Transcribe Orders on 08/12/2022   Component Date Value   • Rubeola IgG 08/12/2022 IMMUNE    • Mumps IgG 08/12/2022 IMMUNE    • Rubella IgG Quant 08/12/2022 >175 0    • Varicella IgG 08/12/2022 IMMUNE    • QFT Nil 08/12/2022 0 05    • QFT TB1-NIL 08/12/2022 -0 02    • QFT TB2-NIL 08/12/2022 0 01    • QFT Mitogen-NIL 08/12/2022 7 36    • QFT Final Interpretation 08/12/2022 Negative          I have personally reviewed pertinent films in PACS    No current facility-administered medications for this visit

## 2023-03-17 ENCOUNTER — TELEPHONE (OUTPATIENT)
Dept: ADMINISTRATIVE | Facility: OTHER | Age: 58
End: 2023-03-17

## 2023-03-17 NOTE — TELEPHONE ENCOUNTER
----- Message from Issac Rossi LPN sent at 3/70/8789  2:52 PM EDT -----  Regarding: care gap request  03/16/23 2:52 PM    Hello, our patient attached above has had Mammogram completed/performed  Please assist in updating the patient chart by pulling the Care Everywhere (CE) document  The date of service is 1/17/2022       Thank you,  Issac Rossi  [facility]

## 2023-03-22 NOTE — TELEPHONE ENCOUNTER
Upon review of the In Basket request we were able to locate, review, and update the patient chart as requested for Mammogram     Any additional questions or concerns should be emailed to the Practice Liaisons via the appropriate education email address, please do not reply via In Basket      Thank you  Carol Munoz

## 2023-04-03 ENCOUNTER — OFFICE VISIT (OUTPATIENT)
Dept: FAMILY MEDICINE CLINIC | Facility: CLINIC | Age: 58
End: 2023-04-03

## 2023-04-03 VITALS
BODY MASS INDEX: 34.72 KG/M2 | WEIGHT: 203.4 LBS | SYSTOLIC BLOOD PRESSURE: 128 MMHG | OXYGEN SATURATION: 98 % | HEART RATE: 80 BPM | RESPIRATION RATE: 16 BRPM | HEIGHT: 64 IN | TEMPERATURE: 98.4 F | DIASTOLIC BLOOD PRESSURE: 78 MMHG

## 2023-04-03 DIAGNOSIS — Z12.4 SCREENING FOR CERVICAL CANCER: ICD-10-CM

## 2023-04-03 DIAGNOSIS — Z00.00 ANNUAL PHYSICAL EXAM: Primary | ICD-10-CM

## 2023-04-03 DIAGNOSIS — Z12.31 ENCOUNTER FOR SCREENING MAMMOGRAM FOR BREAST CANCER: ICD-10-CM

## 2023-04-03 DIAGNOSIS — Z13.220 SCREENING, LIPID: ICD-10-CM

## 2023-04-03 DIAGNOSIS — Z13.1 SCREENING FOR DIABETES MELLITUS: ICD-10-CM

## 2023-04-03 PROBLEM — K62.5 RECTAL BLEEDING: Status: RESOLVED | Noted: 2022-12-23 | Resolved: 2023-04-03

## 2023-04-03 PROBLEM — L50.1 IDIOPATHIC URTICARIA: Status: RESOLVED | Noted: 2017-06-08 | Resolved: 2023-04-03

## 2023-04-03 PROBLEM — F11.20 CONTINUOUS OPIOID DEPENDENCE (HCC): Status: RESOLVED | Noted: 2022-02-08 | Resolved: 2023-04-03

## 2023-04-03 PROBLEM — Z12.11 SCREENING FOR COLORECTAL CANCER: Status: RESOLVED | Noted: 2019-12-23 | Resolved: 2023-04-03

## 2023-04-03 PROBLEM — R19.5 LOOSE STOOLS: Status: RESOLVED | Noted: 2022-12-23 | Resolved: 2023-04-03

## 2023-04-03 PROBLEM — Z12.12 SCREENING FOR COLORECTAL CANCER: Status: RESOLVED | Noted: 2019-12-23 | Resolved: 2023-04-03

## 2023-04-03 NOTE — PROGRESS NOTES
[address]    NAME: Mo Dumont  AGE: 62 y o  SEX: female  : 1965     DATE: 4/3/2023     Assessment and Plan:     Problem List Items Addressed This Visit        Other    Annual physical exam - Primary     Mammogram and labs ordered         Encounter for screening mammogram for breast cancer    Relevant Orders    Mammo screening bilateral w 3d & cad    Screening, lipid    Relevant Orders    Lipid Panel with Direct LDL reflex    Screening for cervical cancer    Relevant Orders    Ambulatory Referral to Obstetrics / Gynecology   Other Visit Diagnoses     Screening for diabetes mellitus        Relevant Orders    Hemoglobin A1C          Immunizations and preventive care screenings were discussed with patient today  Appropriate education was printed on patient's after visit summary  Counseling:  Dental Health: discussed importance of regular tooth brushing, flossing, and dental visits  BMI Counseling: Body mass index is 34 85 kg/m²  The BMI is above normal  Nutrition recommendations include encouraging healthy choices of fruits and vegetables  Rationale for BMI follow-up plan is due to patient being overweight or obese  Depression Screening and Follow-up Plan: Patient was screened for depression during today's encounter  They screened negative with a PHQ-2 score of 0  Return in 1 year (on 4/3/2024)  Chief Complaint:     Chief Complaint   Patient presents with   • Physical Exam     Patient is being seen for an annual physical        History of Present Illness:     Adult Annual Physical   Patient here for a comprehensive physical exam  The patient reports problems - can't lose weight, otherwise feeling well  Diet and Physical Activity  Diet/Nutrition: well balanced diet  Exercise: 3-4 times a week on average        Depression Screening  PHQ-2/9 Depression Screening    Little interest or pleasure in doing things: 0 - not at all  Feeling down, depressed, or hopeless: 0 - not at all  PHQ-2 Score: 0  PHQ-2 Interpretation: Negative depression screen       General Health  Sleep: sleeps well  Hearing: normal - bilateral   Vision: no vision problems  Dental: regular dental visits  /GYN Health  Patient is: postmenopausal  Last menstrual period: n/a  Contraceptive method: n/a  Review of Systems:     Review of Systems   Constitutional: Negative  HENT: Negative  Eyes: Negative  Respiratory: Negative  Cardiovascular: Negative  Gastrointestinal: Negative  Endocrine: Negative  Genitourinary: Negative  Musculoskeletal: Negative  Skin: Negative  Allergic/Immunologic: Negative  Neurological: Negative  Hematological: Negative  Psychiatric/Behavioral: Negative         Past Medical History:     Past Medical History:   Diagnosis Date   • Anesthesia complication     chronic foot pain takes tramadol prn   • Blood clot associated with vein wall inflammation 2011    calf,(3) wrist-from an IV-on warfarin for 1 yr   • Chest pain     last assessed 07/08/13   • Chronic pain disorder     left foot-crushed nerve   • Endometriosis     last assessed 06/10/14   • History of ankle fracture    • Hx of blood clots     positive genetic marker   • PONV (postoperative nausea and vomiting)    • Thoracic outlet syndrome    • Vitamin D deficiency    • Wears glasses       Past Surgical History:     Past Surgical History:   Procedure Laterality Date   • ANKLE SURGERY      tendon    • BREAST SURGERY Left     cyst-1990's   • COLONOSCOPY     • DILATION AND CURETTAGE OF UTERUS     • ENDOMETRIAL ABLATION     • HERNIA REPAIR     • INCISION AND DRAINAGE ANTERIOR THORAX     • KNEE ARTHROSCOPY      tendon surgery   • LAPAROSCOPY     • LIVER BIOPSY     • RESECTION RIBS EXTRAPLEURAL      1 st rib   • SHOULDER ARTHROSCOPY Right     labrum repair   • UMBILICAL HERNIA REPAIR        Social History:     Social History "    Socioeconomic History   • Marital status: /Civil Union     Spouse name: None   • Number of children: None   • Years of education: None   • Highest education level: None   Occupational History   • None   Tobacco Use   • Smoking status: Never   • Smokeless tobacco: Never   Substance and Sexual Activity   • Alcohol use: No   • Drug use: Never     Comment: prescribed tramadol   • Sexual activity: None   Other Topics Concern   • None   Social History Narrative   • None     Social Determinants of Health     Financial Resource Strain: Not on file   Food Insecurity: Not on file   Transportation Needs: Not on file   Physical Activity: Not on file   Stress: Not on file   Social Connections: Not on file   Intimate Partner Violence: Not on file   Housing Stability: Not on file      Family History:     Family History   Problem Relation Age of Onset   • Diabetes Father    • Thyroid disease Sister    • Thyroid disease Maternal Aunt    • Breast cancer Maternal Aunt    • Stroke Maternal Grandmother    • Thyroid disease Cousin    • Thyroid cancer Cousin    • Heart disease Neg Hx       Current Medications:     Current Outpatient Medications   Medication Sig Dispense Refill   • cloNIDine (CATAPRES-TTS-2) 0 2 mg/24 hr if needed       • lidocaine (XYLOCAINE) 5 % ointment   1   • meloxicam (MOBIC) 15 mg tablet daily as needed     • Multiple Vitamins-Minerals (multivitamin with minerals) tablet Take 1 tablet by mouth daily     • traMADol HCl  MG CP24 TAKE 1 TO 2 CAPSULES BY MOUTH AT BEDTIME AS NEEDED       No current facility-administered medications for this visit  Allergies:      Allergies   Allergen Reactions   • Alcohol - Food Allergy Vomiting     Ingested and topical-N/V-migraines-rash-has varying degrees of reactions      Physical Exam:     /78 (BP Location: Left arm, Patient Position: Sitting, Cuff Size: Large)   Pulse 80   Temp 98 4 °F (36 9 °C) (Tympanic)   Resp 16   Ht 5' 4 06\" (1 627 m)   Wt 92 3 " kg (203 lb 6 4 oz)   SpO2 98%   BMI 34 85 kg/m²     Physical Exam  Vitals and nursing note reviewed  Constitutional:       Appearance: Normal appearance  She is well-developed  HENT:      Head: Normocephalic and atraumatic  Right Ear: External ear normal       Left Ear: External ear normal       Nose: Nose normal    Eyes:      Extraocular Movements: Extraocular movements intact  Conjunctiva/sclera: Conjunctivae normal       Pupils: Pupils are equal, round, and reactive to light  Cardiovascular:      Rate and Rhythm: Normal rate and regular rhythm  Heart sounds: Normal heart sounds  Pulmonary:      Effort: Pulmonary effort is normal       Breath sounds: Normal breath sounds  Abdominal:      General: Bowel sounds are normal       Palpations: Abdomen is soft  Musculoskeletal:         General: Normal range of motion  Cervical back: Normal range of motion and neck supple  Skin:     General: Skin is warm and dry  Capillary Refill: Capillary refill takes less than 2 seconds  Neurological:      General: No focal deficit present  Mental Status: She is alert and oriented to person, place, and time  Psychiatric:         Mood and Affect: Mood normal          Behavior: Behavior normal          Thought Content:  Thought content normal          Judgment: Judgment normal           Eleanor Almeida DO  [address] [facility]

## 2023-05-12 ENCOUNTER — APPOINTMENT (OUTPATIENT)
Dept: LAB | Facility: CLINIC | Age: 58
End: 2023-05-12

## 2023-05-12 DIAGNOSIS — Z00.8 HEALTH EXAMINATION IN POPULATION SURVEY: ICD-10-CM

## 2023-05-12 LAB
CHOLEST SERPL-MCNC: 180 MG/DL
EST. AVERAGE GLUCOSE BLD GHB EST-MCNC: 111 MG/DL
HBA1C MFR BLD: 5.5 %
HDLC SERPL-MCNC: 55 MG/DL
LDLC SERPL CALC-MCNC: 113 MG/DL (ref 0–100)
NONHDLC SERPL-MCNC: 125 MG/DL
TRIGL SERPL-MCNC: 58 MG/DL

## 2023-06-02 PROBLEM — Z12.4 SCREENING FOR CERVICAL CANCER: Status: RESOLVED | Noted: 2023-04-03 | Resolved: 2023-06-02

## 2023-07-06 ENCOUNTER — HOSPITAL ENCOUNTER (OUTPATIENT)
Dept: RADIOLOGY | Age: 58
Discharge: HOME | End: 2023-07-06
Payer: COMMERCIAL

## 2023-07-06 VITALS — BODY MASS INDEX: 33.29 KG/M2 | HEIGHT: 64 IN | WEIGHT: 195 LBS

## 2023-07-06 DIAGNOSIS — Z12.31 ENCOUNTER FOR SCREENING MAMMOGRAM FOR BREAST CANCER: ICD-10-CM

## 2023-07-06 PROCEDURE — 77067 SCR MAMMO BI INCL CAD: CPT

## 2023-07-06 PROCEDURE — 77063 BREAST TOMOSYNTHESIS BI: CPT

## 2023-09-13 ENCOUNTER — OFFICE VISIT (OUTPATIENT)
Dept: OBGYN CLINIC | Facility: CLINIC | Age: 58
End: 2023-09-13
Payer: COMMERCIAL

## 2023-09-13 DIAGNOSIS — R63.5 WEIGHT GAIN: Primary | ICD-10-CM

## 2023-09-13 DIAGNOSIS — N64.4 BILATERAL MASTODYNIA: ICD-10-CM

## 2023-09-13 DIAGNOSIS — R68.89 HEAT INTOLERANCE: ICD-10-CM

## 2023-09-13 PROCEDURE — 99205 OFFICE O/P NEW HI 60 MIN: CPT | Performed by: OBSTETRICS & GYNECOLOGY

## 2023-09-15 NOTE — PROGRESS NOTES
Assessment/Plan:       Diagnoses and all orders for this visit:    Weight gain  -     Comprehensive metabolic panel; Future  -     Homocysteine; Future    Bilateral mastodynia  -     Estradiol; Future  -     Progesterone; Future  -     Homocysteine; Future  -     High sensitivity CRP; Future    Heat intolerance  -     Estradiol; Future  -     DHEA-sulfate; Future  -     Testosterone; Future  -     Cortisol Level, AM Specimen; Future        1) This was a lengthy visit spent discussing the HPATG (hypothalamus/pituitary/adrenal/thyroid/gonadal) axis and the impact that hormonal deviation in one gland can have on another. It is not uncommon for ovarian declined to coincide or invoke thyroid and adrenal dysfunction as well. Thyroid axis tested and fine. Offered testing of ovarian and adrenal axis. 2) I sense there is inflammation as cause of breast pain, not hormonal. I will also look at inflammatory markers ( homocysteine, hsCRP, CMP) to help discern. 3) Weight gain: I think she will really have to alter biochemistry to make up for lack of exercise with severe carbohydrate restriction, either with [place] or Keto food plan approach. 4) Breast: supplementation through [employer] offered, BreastBlend, with DIM, curcumin, etc. I do not think this is hormonal either, and may need better fitting undergarments as well  5) I will notify her of test results and determine future follow up from there. I did not render specific nutritional advice, which she will need. 6) We did not touch on HRT as her symptoms do not point to ovarian decline. Will discuss in future if needed. This was a 60 minute visit with greater than 50% of time spent in face to face counseling and coordination of care      Subjective:      Patient ID: Minda Le is a 62 y.o. female. Miguel Hinojosa presents for hormonal consult, her first visit with me; referred by Dr. Priscilla Rojo, sees [facility] for gyn care.   Miguel Hinojosa assumes she is menopausal at her age although status uncertain, s/p endometrial ablation 15 years ago. Prior history of endometriosis with Lupron suppression. She complains most of:  1) Weight gain: feels defeated as everyone tells her " good luck with that". Weight management consultation not helpful, she was hoping for concrete suggestions about dietary adjustments, felt like she was being sold something. ON her own she has tried: intermittent fasting, protein shakes, which enable only a 2 lb weight loss. Exercise is difficult due to "crushed nerve in her foot". She is an , so knowledgeable about other options of exercise. 2) Breast tenderness: bilateral, upper outer sides, continuous. She feels enlargement of the tissue in her armpit at least once a month as if she is still cycling. Has had this tissue biopsied and actually removed years ago, benign pathology noted. Was offered Evening Primrose Oil with no relief. Also has done fish oil, ground flaxseed, also without apparent relief. Wired bras, no change in size lately. 3) Heat intolerance, not really a flash. Can not handle damp heat in summer, but also feels this in winter. Sleep is ok. [address]: hx of endometriosis as above, was told upon DxLS that "liver was a dried up piece of leather"; hx of DVT on OCP and also possibly from Covid vaccine. 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 Dad Alzheimers COD 80. PGM  young, PGP unknown. 2 siblings, no health concern. Twin sons, 25, healthy. Review of Systems   Constitutional: Positive for fatigue and unexpected weight change. Negative for activity change, appetite change and fever. Gastrointestinal: Negative. Endocrine: Positive for heat intolerance. Genitourinary:        Bilateral breast tenderness   Musculoskeletal: Negative for arthralgias, back pain and neck pain. Skin: Negative. Neurological: Negative. Psychiatric/Behavioral: Negative for dysphoric mood and sleep disturbance. Objective: There were no vitals taken for this visit. Physical Exam  Constitutional:       Appearance: Normal appearance. Comments: She asked me to palpate the area of breast pain, corresponds to axillary tail bilaterally. NO palpable axillary lymphadenopathy. Bra size approximately D cup, moderately well supported. Neurological:      Mental Status: She is alert.

## 2023-09-30 ENCOUNTER — APPOINTMENT (OUTPATIENT)
Dept: LAB | Facility: CLINIC | Age: 58
End: 2023-09-30
Payer: COMMERCIAL

## 2023-10-05 ENCOUNTER — TELEMEDICINE (OUTPATIENT)
Dept: OBGYN CLINIC | Facility: CLINIC | Age: 58
End: 2023-10-05
Payer: COMMERCIAL

## 2023-10-05 DIAGNOSIS — R63.5 WEIGHT GAIN: ICD-10-CM

## 2023-10-05 DIAGNOSIS — R73.03 PREDIABETES: Primary | ICD-10-CM

## 2023-10-05 PROCEDURE — 99215 OFFICE O/P EST HI 40 MIN: CPT | Performed by: OBSTETRICS & GYNECOLOGY

## 2023-10-12 ENCOUNTER — CLINICAL SUPPORT (OUTPATIENT)
Dept: NUTRITION | Facility: HOSPITAL | Age: 58
End: 2023-10-12
Payer: COMMERCIAL

## 2023-10-12 ENCOUNTER — TELEPHONE (OUTPATIENT)
Dept: OBGYN CLINIC | Facility: CLINIC | Age: 58
End: 2023-10-12

## 2023-10-12 VITALS — WEIGHT: 195.6 LBS | BODY MASS INDEX: 33.57 KG/M2

## 2023-10-12 DIAGNOSIS — R73.03 PREDIABETES: Primary | ICD-10-CM

## 2023-10-12 DIAGNOSIS — R73.03 PREDIABETES: ICD-10-CM

## 2023-10-12 PROCEDURE — 97802 MEDICAL NUTRITION INDIV IN: CPT | Performed by: DIETITIAN, REGISTERED

## 2023-10-12 NOTE — PROGRESS NOTES
Nutrition Assessment Form    Patient Name: Sandip Najera    YOB: 1965    Sex: Female     Assessment Date: 10/12/2023  Start Time: 9 Stop Time: 10 Total Minutes: 61     Data:  Present at session: self   Parent/Patient Concerns/reason for visit: "I'm looking for some help here- my test are showing prediabestic- I haven't been able to lose any weight"   Medical Dx/Reason for Referral: Prediabetes   Past Medical History:   Diagnosis Date    Anesthesia complication     chronic foot pain takes tramadol prn    Blood clot associated with vein wall inflammation 2011    calf,(3) wrist-from an IV-on warfarin for 1 yr    Chest pain     last assessed 07/08/13    Chronic pain disorder     left foot-crushed nerve    Endometriosis     last assessed 06/10/14    History of ankle fracture     Hx of blood clots     positive genetic marker    PONV (postoperative nausea and vomiting)     Thoracic outlet syndrome     Vitamin D deficiency        Current Outpatient Medications   Medication Sig Dispense Refill    cloNIDine (CATAPRES-TTS-2) 0.2 mg/24 hr if needed        lidocaine (XYLOCAINE) 5 % ointment   1    meloxicam (MOBIC) 15 mg tablet daily as needed      Multiple Vitamins-Minerals (multivitamin with minerals) tablet Take 1 tablet by mouth daily      traMADol HCl  MG CP24 TAKE 1 TO 2 CAPSULES BY MOUTH AT BEDTIME AS NEEDED       No current facility-administered medications for this visit.         Additional Meds/Supplements: Ca and vit D /vit K    Special Learning Needs/barriers to learning/any new barriers N/a   Height: HC Readings from Last 5 Encounters:   No data found for [facility].      Weight: Wt Readings from Last 10 Encounters:   07/06/23 88.5 kg (195 lb)   04/03/23 92.3 kg (203 lb 6.4 oz)   12/23/22 90.7 kg (200 lb)   02/23/22 92.1 kg (203 lb)   02/17/22 92.1 kg (203 lb)   02/08/22 92.4 kg (203 lb 9.6 oz)   02/04/22 94.3 kg (208 lb)   12/01/20 88.5 kg (195 lb)   06/24/20 87.1 kg (192 lb)   06/16/20 90.7 kg (200 lb)     Estimated body mass index is 33.47 kg/m² as calculated from the following:    Height as of 7/6/23: 5' 4" (1.626 m). Weight as of 7/6/23: 88.5 kg (195 lb). Recent Weight Change: [x]Yes     []No  Amount: 8# loss x 6 months- desired and intentional      Energy Needs: 9815 (25cals/kg- 500 for 1#/wk wt loss)   Allergies   Allergen Reactions    Alcohol - Food Allergy Vomiting     Ingested and topical-N/V-migraines-rash-has varying degrees of reactions    or intolerances NKFA   Social History     Substance and Sexual Activity   Alcohol Use No       Social History     Tobacco Use   Smoking Status Never   Smokeless Tobacco Never       Who shops? patient   Who cooks/cooking methods/Eating out/take out habits   patient  Cooking methods: bake/canada/air canada/grill/boil/other________    Minimal eating out- no fast foods or processed food   Exercise: Walking daily- 12-15K - 2-3miles in 45-50mins- not out of breath- different routes/terrains   Other: ie: Sleep habits/ stress level/ work habits household-lives with ?/ food security Current sleep habits: 7-9 hours -consistent- no naps- feels tired sometimes well rested-  has been feeling hot more than 2 years  Lives with  and kids x2- 17yo  Minimal stress- - 40 hours per week    Prior Nutritional Counseling?  []Yes     [x]No  When:      Why:         Diet Hx: 120oz water daily-  ice cream once a week   Breakfast:  300cals /shake Diet: 7-9am  1/4-1/2-1c kombucha- protein shake- dotera protein powder or herbal life protein powder- powdered or real greens -1 tbsp ground flax seed- 1 tbsp fiber powder (6gms fiber)- liquid coffee x16oz  25-30gms protein- mixing with water   Lunch:  350 calories/ meal  12-2 - never skips- meat- fish chicken pork fist portion-  starch rice/quinoa/beans x3/4c vegetables (sauteed) 1/2 plate and maybe a salad on side - 1 tbsp balsamic or protein shake again     Dinner: 5-8-9--  light meal- protein shake- rice cakes with PB and apple butter and vegetables      Snacks:  avocado/guacamole- rice crackers or almond powder cracker AM - 10-12pm apple PB or celery sticks with PB greek yogurt- plain/fruit 1/2c  PM - cottage cheese with pepinos or apple and PB  HS - apple and pb or celery and PB   Other Notes/ Initial Assessment:  Cornell Wren is here today because she wants to lose weight and she is borderline diabetic. She is fairly athletic-walks daily 2-3 miles, seems to sleep well ( though family reports snoring), and has minimal stress. She does have hot flashes that last for hours as well. She does have a foot issue that will bother her after 10 mins of yoga so she is careful. Dipika's runs in her family and her CRP was high- might need to see rheumatology. She is aware she needs to be more active with wt bearing activity- discussed the fact that she needs to be active 150 mins moderate intensity/wk to maintain wt and if she wants to lose, needs to do more than that. States she has had her metabolism checked in the past and was told it was "fine"- not sure what BMR is though. States she does not eat anything processed/artificial sugars but will consume protein powders and diet coke when she is out (rare). Recommended she get checked for JACE. She needs to increase her strength by adding strength training into her current activity 3x/wk x 15 mins, discussed appropriate meals and snacks, adequate fluid intake, front loading her calories, minimizing po intake 3 hours before bed. Suggested 8608-6116 calorie daily limit, if not hungry for that evening snack then ok to skip. Discussed importance of tracking calories even for 2 weeks either writing it down or using free ap on phone such as [employer]odies [employer].       Updated assessment (Follow up note only):       Nutrition Diagnosis:   Altered Nutrition-Related Laboratory values  related to Kidney, liver, cardiac, endocrine, neurologic, and/or pulmonary dysfunction as evidenced by  Abnormal plasma glucose and/or HgbA1c levels       Any change or new dx since previous visit:     Nutrition Diagnosis:   N/a      Medical Nutrition Therapy Intervention:  [x]Individualized Meal Plan-Discussed the importance of eating 3 meals daily and not skipping, and how metabolism is affected. Also discussed adding in small snacks or 5-6 small meals daily if desired vs 3 larger ones, and appropriate options and portions. Appropriate timing of meals, including eating within 1 hr of waking and eating meals slowly 20mins/meals, minimizing mindless/boredom or habitual eating, etc was also mentioned. Discussed the plate method of portioning foods, including half a plate fruits and vegetables or a half plate all vegetables, 1/4 of the plate a lean protein source or meat, and a 1/4 of the plate being a whole grain carb- usually 1/2-1c. This should be followed for at least 2 meals of the day, but could also be followed for all 3. Fluid intake discussed and appropriate amounts and choices, 16 oz with meals and additional 32oz during the day. S/S dehydration also covered. [x]Understanding Lab Values- 9/30/23  Hgb A1C 5.8- previously had gestational diabetes and family hx   []Basic Pathophysiology of Disease []Food/Medication Interactions   [x]Food Diary-discussed written or myfitnesspal [x]Exercise-150 mins of moderate activity weekly, discussed importance of variety of activity, including wt bearing activities 2-3x/wk x 10-15mins. Discussed importance of activity throughout the lifecycle and its impact on overall health/stress management, etc.   [x]Lifestyle/Behavior Modification Techniques-Discussed the importance of adequate sleep, and ideal sleeping conditions, including a cool temperature 64-68 degrees F, and a dark and quiet room. Also discussed the importance of a regular and consistent sleep routine, including minimizing blue light exposure an hour before sleeping.   Weekend habits should include staying fairly consistent with weekday sleep habits to minimize disruption during the week. A connection was made between getting adequate and good quality sleep and the ability to handle stress the next day, make healthy food choices, and be active []Medication, Mechanism of Action   []Label Reading: CHO/ Na/ Fat/ other_________ []Self Blood Glucose Monitoring   [x]Weight/BMI Goals: gain/lose/maintain-Short term goal of 2-4# x 1 month or next visit []Other -           Comprehension: []Excellent  []Very Good  [x]Good  []Fair   []Poor    Receptivity: []Excellent  []Very Good  [x]Good  []Fair   []Poor    Expected Compliance: []Excellent  []Very Good  [x]Good  []Fair   []Poor        Goals (initial)/ Progress made on previous goals/new goals:  1.1200-1500cals -food journal x 2 weeks   2. Continue current fluid intake   3. Portions per plate method as dicussed with RD       No follow-ups on file.   Labs:  CMP  Lab Results   Component Value Date    K 4.1 09/30/2023     09/30/2023    CO2 27 09/30/2023    BUN 19 09/30/2023    CREATININE 0.94 09/30/2023    GLUF 111 (H) 09/30/2023    CALCIUM 9.6 09/30/2023    AST 13 09/30/2023    ALT 16 09/30/2023    ALKPHOS 66 09/30/2023    EGFR 67 09/30/2023       BMP  Lab Results   Component Value Date    CALCIUM 9.6 09/30/2023    K 4.1 09/30/2023    CO2 27 09/30/2023     09/30/2023    BUN 19 09/30/2023    CREATININE 0.94 09/30/2023       Lipids  No results found for: "CHOL"  Lab Results   Component Value Date    HDL 52 09/30/2023    HDL 55 05/12/2023    HDL 63 05/29/2020     Lab Results   Component Value Date    LDLCALC 128 (H) 09/30/2023    LDLCALC 113 (H) 05/12/2023    LDLCALC 119 (H) 05/29/2020     Lab Results   Component Value Date    TRIG 76 09/30/2023    TRIG 58 05/12/2023    TRIG 46 05/29/2020     No results found for: "CHOLHDL"    Hemoglobin A1C  Lab Results   Component Value Date    HGBA1C 5.8 (H) 09/30/2023       Fasting Glucose  Lab Results   Component Value Date    GLUF 111 (H) 09/30/2023 Insulin     Thyroid  No results found for: "TSH", "M2MZGKN", "D4OUIQG", "THYROIDAB"    Hepatic Function Panel  Lab Results   Component Value Date    ALT 16 09/30/2023    AST 13 09/30/2023    ALKPHOS 66 09/30/2023       Celiac Disease Antibody Panel  No results found for: "ENDOMYSIAL IGA", "GLIADIN IGA", "GLIADIN IGG", "IGA", "TISSUE TRANSGLUT AB", "TTG IGA"   Iron  Lab Results   Component Value Date    IRON 79 06/11/2021    FERRITIN 51 06/11/2021            Erika Ramos, [address] [facility]  [address] [id number]

## 2023-10-12 NOTE — TELEPHONE ENCOUNTER
Pt is calling because she still hasn't received her glucose monitor and glucose monitoring strips.  [facility] is requesting a Nurse call in this RX. Pharmacy . ke's at [facility] [employer]. Please advise.

## 2023-10-13 ENCOUNTER — TELEPHONE (OUTPATIENT)
Dept: OBGYN CLINIC | Facility: CLINIC | Age: 58
End: 2023-10-13

## 2023-10-13 NOTE — PROGRESS NOTES
Virtual Regular Visit    Verification of patient location:    Patient is located at Other in the following state in which I hold an active license [place]      Assessment/Plan:    Problem List Items Addressed This Visit          Other    Prediabetes - Primary    Relevant Orders    One Touch Verio IQ    Glucometer test strips    Ambulatory Referral to Nutrition Services     Other Visit Diagnoses       Weight gain              1) Labs first reviewed, noting: a) elevated hsCRP of 4.94, elevating CVD risk. CMP shows elevated prediabetic level FBS 11, as well as mild elevation of HGBA1C. . Lipid profile satisfactory. B) Estradiol level checked in light of breast tenderness, this is low and menopausal 18.5. c) Adrenal precursor DHEAS normal 114; testosterone low normal 25, cortisol mid range at 12.   2) I recommend she eat like a diabetic and actually meet with a diabetic nutritionist. She agrees, consult ordered. She is interested in glucometer testing, orders sent in to test twice daily or prn. May forward results to me or PCP. I did warn her to reign in the fruit in her smoothie, but otherwise her diet is clean. 3) She would also qualify for [facility] and be an ideal candidate. She prefers to work with lifestyle changes first. Does not need appetite suppressants either. 4) Foods to keep lipid profile in check: red yeast rice, tart cherry juice. 5) does not desire HT at this point. 6) Follow up with me or PCP in 3 months or so to evaluate progress. Follow up labs in 4-6 months after interventions initiated.      This was a 50 minute visit with greater than 50% of time spent in face to face counseling and coordination of care       Reason for visit is   Chief Complaint   Patient presents with    Virtual Regular Visit          Encounter provider Patrica Blanton MD    Provider located at [facility] [address] [place] [id number]  [phone]      Recent Visits  Date Type Provider Dept   10/05/23 Telemedicine Shanna Suero MD [facility]   Showing recent visits within past 7 days and meeting all other requirements  Future Appointments  No visits were found meeting these conditions. Showing future appointments within next 150 days and meeting all other requirements       The patient was identified by name and date of birth. Denia Rosado was informed that this is a telemedicine visit and that the visit is being conducted through the [employer]. She agrees to proceed. .  My office door was closed. No one else was in the room. She acknowledged consent and understanding of privacy and security of the video platform. The patient has agreed to participate and understands they can discontinue the visit at any time. Patient is aware this is a billable service. Andrés Little presents for hormone consult follow up. I saw her last month with complaints of weight gain, despite intermittent fasting, many food plans, mild heat intolerance. She has met with [facility] weight management in the past but felt like she was being sold something, so did not enjoy. Not an ideal HRT candidate due to DVP from OCP use. I offered labs to check HPA axis as well as hsCRP, homocystiene, CMP, lipid, which we will review today. Food log:  Bkfst: protein shake with pineapple, grapes, peaches, protein powder, flaxseed, greens, water bas. Lunch: apple. PB, crackers. Sometimes a protein date bar  Dinner: noodles, chicken breast, green beans, small salad. NO ETOH, no dessert.         Past Medical History:   Diagnosis Date    Anesthesia complication     chronic foot pain takes tramadol prn    Blood clot associated with vein wall inflammation 2011    calf,(3) wrist-from an IV-on warfarin for 1 yr    Chest pain     last assessed 07/08/13    Chronic pain disorder     left foot-crushed nerve    Endometriosis last assessed 06/10/14    History of ankle fracture     Hx of blood clots     positive genetic marker    PONV (postoperative nausea and vomiting)     Thoracic outlet syndrome     Vitamin D deficiency        Past Surgical History:   Procedure Laterality Date    ANKLE SURGERY      tendon     BREAST SURGERY Left     [id number]    COLONOSCOPY      DILATION AND CURETTAGE OF UTERUS      ENDOMETRIAL ABLATION      HERNIA REPAIR      INCISION AND DRAINAGE ANTERIOR THORAX      KNEE ARTHROSCOPY      tendon surgery    LAPAROSCOPY      LIVER BIOPSY      REDUCTION MAMMAPLASTY Bilateral     excessive tissue removed in axillary region    RESECTION RIBS EXTRAPLEURAL      1 st rib    SHOULDER ARTHROSCOPY Right     labrum repair    UMBILICAL HERNIA REPAIR         Current Outpatient Medications   Medication Sig Dispense Refill    cloNIDine (CATAPRES-TTS-2) 0.2 mg/24 hr if needed        lidocaine (XYLOCAINE) 5 % ointment   1    meloxicam (MOBIC) 15 mg tablet daily as needed      Multiple Vitamins-Minerals (multivitamin with minerals) tablet Take 1 tablet by mouth daily      traMADol HCl  MG CP24 TAKE 1 TO 2 CAPSULES BY MOUTH AT BEDTIME AS NEEDED       No current facility-administered medications for this visit. Allergies   Allergen Reactions    Alcohol - Food Allergy Vomiting     Ingested and topical-N/V-migraines-rash-has varying degrees of reactions       Review of Systems   Constitutional:  Positive for activity change, fatigue and unexpected weight change. Negative for appetite change. Gastrointestinal: Negative. Endocrine: Positive for heat intolerance. Musculoskeletal: Negative. Psychiatric/Behavioral: Negative. Video Exam    There were no vitals filed for this visit.     Physical Exam

## 2023-10-17 DIAGNOSIS — R73.03 PRE-DIABETES: Primary | ICD-10-CM

## 2023-12-20 ENCOUNTER — APPOINTMENT (OUTPATIENT)
Dept: RADIOLOGY | Facility: OTHER | Age: 58
End: 2023-12-20
Payer: COMMERCIAL

## 2023-12-20 ENCOUNTER — OFFICE VISIT (OUTPATIENT)
Dept: OBGYN CLINIC | Facility: OTHER | Age: 58
End: 2023-12-20
Payer: COMMERCIAL

## 2023-12-20 VITALS
HEIGHT: 64 IN | HEART RATE: 64 BPM | SYSTOLIC BLOOD PRESSURE: 141 MMHG | DIASTOLIC BLOOD PRESSURE: 86 MMHG | WEIGHT: 199.8 LBS | BODY MASS INDEX: 34.11 KG/M2

## 2023-12-20 DIAGNOSIS — S93.401A SPRAIN OF UNSPECIFIED LIGAMENT OF RIGHT ANKLE, INITIAL ENCOUNTER: Primary | ICD-10-CM

## 2023-12-20 DIAGNOSIS — M25.571 PAIN, JOINT, ANKLE AND FOOT, RIGHT: ICD-10-CM

## 2023-12-20 PROCEDURE — 73610 X-RAY EXAM OF ANKLE: CPT

## 2023-12-20 PROCEDURE — 99203 OFFICE O/P NEW LOW 30 MIN: CPT | Performed by: ORTHOPAEDIC SURGERY

## 2023-12-20 NOTE — PROGRESS NOTES
"Orthopaedic Surgery - Office Note  Che Ascencio (58 y.o. female)   : 1965   MRN: [id number]  Encounter Date: 2023    Chief Complaint   Patient presents with    Right Ankle - Pain       Assessment / Plan  Right ankle sprain     X-ray of the right ankle was reviewed in the office today which showed no acute fractures or dislocations  Patient may be weightbearing as tolerated to the right lower extremity  Recommend patient to ice and elevate to help decrease swelling  Recommend patient to wear cam boot as needed for comfort  Physical therapy order was given out today for proprioception exercises, stretching and range of motion exercises  May take over-the-counter NSAIDs as needed for pain relief  Return if symptoms worsen or fail to improve.    History of Present Illness  Che Ascencio is a 58 y.o. female who presents today evaluation for right ankle pain.  Patient states she sprained her right ankle a few days ago.  She states she has been able to weight-bear.  She does have history of bilateral ankle avulsions and also history of talar OCD about 8-9 years  ago by Dr. Phillips at [facility]. She is having some pain over lateral and medial right ankle. She feels her dorsiflex is limites.She has been wearing tubigrip to help decrease the swelling.     Review of Systems  Pertinent items are noted in HPI.  All other systems were reviewed and are negative.    Physical Exam  /86   Pulse 64   Ht 5' 4\" (1.626 m)   Wt 90.6 kg (199 lb 12.8 oz)   BMI 34.30 kg/m²   Cons: Appears well.  No apparent distress.  Psych: Alert. Oriented x3.  Mood and affect normal.  Eyes: PERRLA, EOMI  Resp: Normal effort.  No audible wheezing or stridor.  CV: Palpable pulse.  No discernable arrhythmia.  No LE edema.  Lymph:  No palpable cervical, axillary, or inguinal lymphadenopathy.  Skin: Warm.  No palpable masses.  No visible lesions.  Neuro: Normal muscle tone.  Normal and symmetric DTR's.     Right Foot & Ankle " Exam  Alignment:  Normal ankle alignment.  Inspection:   lateral  swelling.  Palpation:   lateral ankle  tenderness.  ROM:  Ankle DF 0. Ankle PF 40.  Strength:  Not tested.  Stability:  Not tested.  Tests:  No pertinent positive or negative tests.  Neurovascular:  Sensation intact in DP/SP/Abdul/Sa/T nerve distributions. 2+ radial pulse.  Gait:  Antalgic.     Studies Reviewed  XR of right ankle - dated 12/20/23 demonstrates no acute fractures or dislocations, there is some osteophyte formation navicular joint, calcium deposit over the Achilles insertion    Procedures  No procedures today.    Medical, Surgical, Family, and Social History  The patient's medical history, family history, and social history, were reviewed and updated as appropriate.    Past Medical History:   Diagnosis Date    Anesthesia complication     chronic foot pain takes tramadol prn    Blood clot associated with vein wall inflammation 2011    calf,(3) wrist-from an IV-on warfarin for 1 yr    Chest pain     last assessed 07/08/13    Chronic pain disorder     left foot-crushed nerve    Endometriosis     last assessed 06/10/14    History of ankle fracture     Hx of blood clots     positive genetic marker    PONV (postoperative nausea and vomiting)     Thoracic outlet syndrome     Vitamin D deficiency        Past Surgical History:   Procedure Laterality Date    ANKLE SURGERY      tendon     BREAST SURGERY Left     cyst-1990's    COLONOSCOPY      DILATION AND CURETTAGE OF UTERUS      ENDOMETRIAL ABLATION      HERNIA REPAIR      INCISION AND DRAINAGE ANTERIOR THORAX      KNEE ARTHROSCOPY      tendon surgery    LAPAROSCOPY      LIVER BIOPSY      REDUCTION MAMMAPLASTY Bilateral     excessive tissue removed in axillary region    RESECTION RIBS EXTRAPLEURAL      1 st rib    SHOULDER ARTHROSCOPY Right     labrum repair    UMBILICAL HERNIA REPAIR         Family History   Problem Relation Age of Onset    No Known Problems Mother     Diabetes Father     Thyroid  disease Sister     No Known Problems Sister     Stroke Maternal Grandmother     No Known Problems Maternal Grandfather     No Known Problems Paternal Grandmother     No Known Problems Paternal Grandfather     Thyroid disease Maternal Aunt     Breast cancer Maternal Aunt 29    No Known Problems Maternal Aunt     No Known Problems Maternal Aunt     Thyroid disease Cousin     Thyroid cancer Cousin 45    No Known Problems Son     No Known Problems Son     No Known Problems Paternal Aunt     No Known Problems Paternal Aunt     No Known Problems Paternal Aunt     No Known Problems Paternal Aunt     No Known Problems Paternal Aunt     Heart disease Neg Hx        Social History     Occupational History    Not on file   Tobacco Use    Smoking status: Never    Smokeless tobacco: Never   Substance and Sexual Activity    Alcohol use: No    Drug use: Never     Comment: prescribed tramadol    Sexual activity: Not on file       Allergies   Allergen Reactions    Alcohol - Food Allergy Vomiting     Ingested and topical-N/V-migraines-rash-has varying degrees of reactions         Current Outpatient Medications:     cloNIDine (CATAPRES-TTS-2) 0.2 mg/24 hr, if needed  , Disp: , Rfl:     lidocaine (XYLOCAINE) 5 % ointment, , Disp: , Rfl: 1    meloxicam (MOBIC) 15 mg tablet, daily as needed, Disp: , Rfl:     Multiple Vitamins-Minerals (multivitamin with minerals) tablet, Take 1 tablet by mouth daily, Disp: , Rfl:     traMADol HCl  MG CP24, TAKE 1 TO 2 CAPSULES BY MOUTH AT BEDTIME AS NEEDED, Disp: , Rfl:         Scribe Attestation      I,:  Jas Todd am acting as a scribe while in the presence of the attending physician.:       I,:  Derek Jamison MD personally performed the services described in this documentation    as scribed in my presence.:

## 2024-01-09 ENCOUNTER — CLINICAL SUPPORT (OUTPATIENT)
Dept: NUTRITION | Facility: HOSPITAL | Age: 59
End: 2024-01-09
Payer: COMMERCIAL

## 2024-01-09 VITALS — BODY MASS INDEX: 34.95 KG/M2 | WEIGHT: 203.6 LBS

## 2024-01-09 DIAGNOSIS — R73.03 DIABETES MELLITUS, LATENT: Primary | ICD-10-CM

## 2024-01-09 PROCEDURE — 97803 MED NUTRITION INDIV SUBSEQ: CPT | Performed by: DIETITIAN, REGISTERED

## 2024-01-09 NOTE — PROGRESS NOTES
" Nutrition Assessment Form    Patient Name: Che Ascencio    YOB: 1965    Sex: Female     Assessment Date: 1/9/2024  Start Time: 930 Stop Time: 10 Total Minutes: 30     Data:  Present at session: self   Parent/Patient Concerns/reason for visit: \"No changes in my body but I made some changes\"   Medical Dx/Reason for Referral: Prediabetes   Past Medical History:   Diagnosis Date    Anesthesia complication     chronic foot pain takes tramadol prn    Blood clot associated with vein wall inflammation 2011    calf,(3) wrist-from an IV-on warfarin for 1 yr    Chest pain     last assessed 07/08/13    Chronic pain disorder     left foot-crushed nerve    Endometriosis     last assessed 06/10/14    History of ankle fracture     Hx of blood clots     positive genetic marker    PONV (postoperative nausea and vomiting)     Thoracic outlet syndrome     Vitamin D deficiency        Current Outpatient Medications   Medication Sig Dispense Refill    cloNIDine (CATAPRES-TTS-2) 0.2 mg/24 hr if needed        lidocaine (XYLOCAINE) 5 % ointment   1    meloxicam (MOBIC) 15 mg tablet daily as needed      Multiple Vitamins-Minerals (multivitamin with minerals) tablet Take 1 tablet by mouth daily      traMADol HCl  MG CP24 TAKE 1 TO 2 CAPSULES BY MOUTH AT BEDTIME AS NEEDED       No current facility-administered medications for this visit.        Additional Meds/Supplements: Ca and vit D /vit K    Special Learning Needs/barriers to learning/any new barriers N/a   Height: HC Readings from Last 5 Encounters:   No data found for HC      Weight: Wt Readings from Last 10 Encounters:   12/20/23 90.6 kg (199 lb 12.8 oz)   10/12/23 88.7 kg (195 lb 9.6 oz)   07/06/23 88.5 kg (195 lb)   04/03/23 92.3 kg (203 lb 6.4 oz)   12/23/22 90.7 kg (200 lb)   02/23/22 92.1 kg (203 lb)   02/17/22 92.1 kg (203 lb)   02/08/22 92.4 kg (203 lb 9.6 oz)   02/04/22 94.3 kg (208 lb)   12/01/20 88.5 kg (195 lb)     Estimated body mass index is 34.3 " "kg/m² as calculated from the following:    Height as of 12/20/23: 5' 4\" (1.626 m).    Weight as of 12/20/23: 90.6 kg (199 lb 12.8 oz).   Recent Weight Change: [x]Yes     []No  Amount: 8# loss x 6 months- desired and intentional      Energy Needs: 1712 (25cals/kg- 500 for 1#/wk wt loss)   Allergies   Allergen Reactions    Alcohol - Food Allergy Vomiting     Ingested and topical-N/V-migraines-rash-has varying degrees of reactions    or intolerances NKFA   Social History     Substance and Sexual Activity   Alcohol Use No       Social History     Tobacco Use   Smoking Status Never   Smokeless Tobacco Never       Who shops? patient   Who cooks/cooking methods/Eating out/take out habits   patient  Cooking methods: bake/canada/air canada/grill/boil/other________    Minimal eating out- no fast foods or processed food   Exercise: Walking daily- 12-15K - 2-3miles in 45-50mins- not out of breath- different routes/terrains   Other: ie: Sleep habits/ stress level/ work habits household-lives with ?/ food security Current sleep habits: 7-9 hours -consistent- no naps- feels tired sometimes well rested-  has been feeling hot more than 2 years  Lives with  and kids x2- 23yo  Minimal stress- - 40 hours per week    Prior Nutritional Counseling? []Yes     [x]No  When:      Why:         Diet Hx: 120oz water daily-  ice cream once a week   Breakfast:  300cals /shake Diet: 7-9am  1/4-1/2-1c kombucha- protein shake- dotera protein powder or herbal life protein powder- powdered or real greens -1 tbsp ground flax seed- 1 tbsp fiber powder (6gms fiber)- liquid coffee x16oz  25-30gms protein- mixing with water   Lunch:  350 calories/ meal  12-2 - never skips- meat- fish chicken pork fist portion-  starch rice/quinoa/beans x3/4c vegetables (sauteed) 1/2 plate and maybe a salad on side - 1 tbsp balsamic or protein shake again     Dinner: 5-8-9--  light meal- protein shake- rice cakes with PB and apple butter and vegetables    " "  Snacks:  avocado/guacamole- rice crackers or almond powder cracker AM - 10-12pm apple PB or celery sticks with PB greek yogurt- plain/fruit 1/2c  PM - cottage cheese with pepinos or apple and PB  HS - apple and pb or celery and PB   Other Notes/ Initial Assessment:  Che is here today because she wants to lose weight and she is borderline diabetic.  She is fairly athletic-walks daily 2-3 miles, seems to sleep well ( though family reports snoring), and has minimal stress. She does have hot flashes that last for hours as well.  She does have a foot issue that will bother her after 10 mins of yoga so she is careful.   Dipika's runs in her family and her CRP was high- might need to see rheumatology.  She is aware she needs to be more active with wt bearing activity- discussed the fact that she needs to be active 150 mins moderate intensity/wk to maintain wt and if she wants to lose, needs to do more than that.  States she has had her metabolism checked in the past and was told it was \"fine\"- not sure what BMR is though.  States she does not eat anything processed/artificial sugars but will consume protein powders and diet coke when she is out (rare).    Recommended she get checked for JACE.  She needs to increase her strength by adding strength training into her current activity 3x/wk x 15 mins, discussed appropriate meals and snacks, adequate fluid intake, front loading her calories, minimizing po intake 3 hours before bed.  Suggested 1701-2815 calorie daily limit, if not hungry for that evening snack then ok to skip.  Discussed importance of tracking calories even for 2 weeks either writing it down or using free ap on phone such as [employer].    Updated assessment (Follow up note only):  1/9/24  Che had made some changes- including walking daily for 1 hr- until her back started spasming at [place] time but walking helped just not as intense.  She then sprained her ankle which stopped her completely.    She " was walking a 20 mins mile but she feels she could get it to an 18 min mile but back interrupted.  Her ankle is very unstable right now so she is wearing boots.  She also counted calories for a good number of days and was eating about 0553-4760 calories.  She did end up being hungry some days.  Her job hours also changed which is affecting her.  She was using [employer] diary to track her calories.  She did not find it helpful at all and was annoyed with it.  She will check her wt at home about every other day.  She did not have any new labs since last visit. Currently with her late hours she will eat something small but protein based because she is hungry.  She is checking her BG at home running between .  She is trying to control her sweets did have more over the holidays, but is continuing to work on it.  She does not eat out much and is drinking adequate fluids.  She knows the key to her health right now is activity but she currently needs to let her ankle and back heal.         Nutrition Diagnosis:   Altered Nutrition-Related Laboratory values  related to Kidney, liver, cardiac, endocrine, neurologic, and/or pulmonary dysfunction as evidenced by  Abnormal plasma glucose and/or HgbA1c levels       Any change or new dx since previous visit:     Nutrition Diagnosis:   N/a      Medical Nutrition Therapy Intervention:  [x]Individualized Meal Plan-Discussed the importance of eating 3 meals daily and not skipping, and how metabolism is affected.  Also discussed adding in small snacks or 5-6 small meals daily if desired vs 3 larger ones, and appropriate options and portions.  Appropriate timing of meals, including eating within 1 hr of waking and eating meals slowly 20mins/meals, minimizing mindless/boredom or habitual eating, etc was also mentioned.  Discussed the plate method of portioning foods, including half a plate fruits and vegetables or a half plate all vegetables, 1/4 of the plate a lean protein source or  meat, and a 1/4 of the plate being a whole grain carb- usually 1/2-1c.  This should be followed for at least 2 meals of the day, but could also be followed for all 3.  Fluid intake discussed and appropriate amounts and choices, 16 oz with meals and additional 32oz during the day.  S/S dehydration also covered. [x]Understanding Lab Values- 9/30/23  Hgb A1C 5.8- previously had gestational diabetes and family hx   []Basic Pathophysiology of Disease []Food/Medication Interactions   [x]Food Diary-discussed written or [employer] [x]Exercise-150 mins of moderate activity weekly, discussed importance of variety of activity, including wt bearing activities 2-3x/wk x 10-15mins. Discussed importance of activity throughout the lifecycle and its impact on overall health/stress management, etc.   [x]Lifestyle/Behavior Modification Techniques-Discussed the importance of adequate sleep, and ideal sleeping conditions, including a cool temperature 64-68 degrees F, and a dark and quiet room. Also discussed the importance of a regular and consistent sleep routine, including minimizing blue light exposure an hour before sleeping.  Weekend habits should include staying fairly consistent with weekday sleep habits to minimize disruption during the week.  A connection was made between getting adequate and good quality sleep and the ability to handle stress the next day, make healthy food choices, and be active []Medication, Mechanism of Action   []Label Reading: CHO/ Na/ Fat/ other_________ [x]Self Blood Glucose Monitoring-  she is checking at home fasting in the morning only   [x]Weight/BMI Goals: gain/lose/maintain-Short term goal of 2-4# x 1 month or next visit []Other -           Comprehension: []Excellent  []Very Good  [x]Good  []Fair   []Poor    Receptivity: []Excellent  []Very Good  [x]Good  []Fair   []Poor    Expected Compliance: []Excellent  []Very Good  [x]Good  []Fair   []Poor        Goals (initial)/ Progress made on previous  "goals/new goals:  1.1200-1500cals -food journal x 2 weeks-  achieved discontinue   2.  Continue current fluid intake-  achieved continue   3.  Portions per plate method as dicussed with RD- achieved continue  4.  1/9/24 work on activity as physically able       No follow-ups on file.  Labs:  CMP  Lab Results   Component Value Date    K 4.1 09/30/2023     09/30/2023    CO2 27 09/30/2023    BUN 19 09/30/2023    CREATININE 0.94 09/30/2023    GLUF 111 (H) 09/30/2023    CALCIUM 9.6 09/30/2023    AST 13 09/30/2023    ALT 16 09/30/2023    ALKPHOS 66 09/30/2023    EGFR 67 09/30/2023       BMP  Lab Results   Component Value Date    CALCIUM 9.6 09/30/2023    K 4.1 09/30/2023    CO2 27 09/30/2023     09/30/2023    BUN 19 09/30/2023    CREATININE 0.94 09/30/2023       Lipids  No results found for: \"CHOL\"  Lab Results   Component Value Date    HDL 52 09/30/2023    HDL 55 05/12/2023    HDL 63 05/29/2020     Lab Results   Component Value Date    LDLCALC 128 (H) 09/30/2023    LDLCALC 113 (H) 05/12/2023    LDLCALC 119 (H) 05/29/2020     Lab Results   Component Value Date    TRIG 76 09/30/2023    TRIG 58 05/12/2023    TRIG 46 05/29/2020     No results found for: \"CHOLHDL\"    Hemoglobin A1C  Lab Results   Component Value Date    HGBA1C 5.8 (H) 09/30/2023       Fasting Glucose  Lab Results   Component Value Date    GLUF 111 (H) 09/30/2023       Insulin     Thyroid  No results found for: \"TSH\", \"X1FBYEC\", \"R2ZGVEY\", \"THYROIDAB\"    Hepatic Function Panel  Lab Results   Component Value Date    ALT 16 09/30/2023    AST 13 09/30/2023    ALKPHOS 66 09/30/2023       Celiac Disease Antibody Panel  No results found for: \"ENDOMYSIAL IGA\", \"GLIADIN IGA\", \"GLIADIN IGG\", \"IGA\", \"TISSUE TRANSGLUT AB\", \"TTG IGA\"   Iron  Lab Results   Component Value Date    IRON 79 06/11/2021    FERRITIN 51 06/11/2021            Erika Ramos RD  [facility]  [address] [facility] [id number]

## 2024-01-11 ENCOUNTER — TELEPHONE (OUTPATIENT)
Dept: OBGYN CLINIC | Facility: CLINIC | Age: 59
End: 2024-01-11

## 2024-01-11 NOTE — TELEPHONE ENCOUNTER
Message left on [employer]. I agree, no need for follow up if follow up plan not optimized. Can you call her and cancel her upcoming appt?

## 2024-01-11 NOTE — TELEPHONE ENCOUNTER
----- Message from Che Ascencio sent at 1/11/2024 11:37 AM EST -----  Regarding: Should I keep appt. or reschedule??  Contact: [phone]  [facility],  Nothing has changed.   Have had some injuries so physical activity other than walking regular for 2-3months, which has been currently random due to other injury.  Testing levels in a.m. range . Nutritionist stated they are ok numbers so confused by that.    Hoping to again try to get active and see what that does.  What would be good time to follow up. Not sure, next week's appt. would be beneficial or provide new info.    I would like follow up after I can get active and see how that affects everything.  Che Ascencio

## 2024-04-16 ENCOUNTER — OFFICE VISIT (OUTPATIENT)
Dept: FAMILY MEDICINE CLINIC | Facility: CLINIC | Age: 59
End: 2024-04-16
Payer: COMMERCIAL

## 2024-04-16 VITALS
TEMPERATURE: 97.6 F | BODY MASS INDEX: 34.28 KG/M2 | SYSTOLIC BLOOD PRESSURE: 130 MMHG | DIASTOLIC BLOOD PRESSURE: 90 MMHG | RESPIRATION RATE: 16 BRPM | HEART RATE: 72 BPM | WEIGHT: 200.8 LBS | HEIGHT: 64 IN | OXYGEN SATURATION: 97 %

## 2024-04-16 DIAGNOSIS — E66.09 CLASS 1 OBESITY DUE TO EXCESS CALORIES WITH SERIOUS COMORBIDITY AND BODY MASS INDEX (BMI) OF 34.0 TO 34.9 IN ADULT: ICD-10-CM

## 2024-04-16 DIAGNOSIS — Z12.31 ENCOUNTER FOR SCREENING MAMMOGRAM FOR MALIGNANT NEOPLASM OF BREAST: ICD-10-CM

## 2024-04-16 DIAGNOSIS — Z00.00 ANNUAL PHYSICAL EXAM: Primary | ICD-10-CM

## 2024-04-16 DIAGNOSIS — Z13.1 SCREENING FOR DIABETES MELLITUS: ICD-10-CM

## 2024-04-16 DIAGNOSIS — R73.03 PREDIABETES: Primary | ICD-10-CM

## 2024-04-16 DIAGNOSIS — E55.9 VITAMIN D DEFICIENCY: ICD-10-CM

## 2024-04-16 DIAGNOSIS — Z13.220 SCREENING, LIPID: ICD-10-CM

## 2024-04-16 DIAGNOSIS — R73.03 PREDIABETES: ICD-10-CM

## 2024-04-16 PROBLEM — M19.90 INFLAMMATORY ARTHROPATHY: Status: RESOLVED | Noted: 2020-10-08 | Resolved: 2024-04-16

## 2024-04-16 PROCEDURE — 99396 PREV VISIT EST AGE 40-64: CPT | Performed by: FAMILY MEDICINE

## 2024-04-16 RX ORDER — LANCETS
EACH MISCELLANEOUS DAILY
Qty: 100 EACH | Refills: 1 | Status: SHIPPED | OUTPATIENT
Start: 2024-04-16

## 2024-04-16 RX ORDER — CARVEDILOL 25 MG/1
TABLET, FILM COATED ORAL
Qty: 100 STRIP | Refills: 1 | Status: SHIPPED | OUTPATIENT
Start: 2024-04-16

## 2024-04-16 NOTE — PROGRESS NOTES
ADULT ANNUAL PHYSICAL  [facility]    NAME: Che Ascencio  AGE: 59 y.o. SEX: female  : 1965     DATE: 2024     Assessment and Plan:     Problem List Items Addressed This Visit       Class 1 obesity due to excess calories with serious comorbidity and body mass index (BMI) of 34.0 to 34.9 in adult     Pre-diabetes  Walking  Exercising  Watching diet  Checking blood sugar         Screening, lipid - Primary     Check lipid         Prediabetes     Checking blood sugars         Relevant Orders    Comprehensive metabolic panel    Vitamin D deficiency    Relevant Orders    Vitamin D 25 hydroxy     Other Visit Diagnoses       Encounter for screening mammogram for malignant neoplasm of breast        Relevant Orders    Mammo screening bilateral w 3d & cad            Immunizations and preventive care screenings were discussed with patient today. Appropriate education was printed on patient's after visit summary.    Counseling:  Dental Health: discussed importance of regular tooth brushing, flossing, and dental visits.      Depression Screening and Follow-up Plan: Patient was screened for depression during today's encounter. They screened negative with a PHQ-2 score of 0.        Return in about 1 year (around 2025) for Annual physical.     Chief Complaint:     Chief Complaint   Patient presents with    Physical Exam     Patient being seen for physical       History of Present Illness:     Adult Annual Physical   Patient here for a comprehensive physical exam. The patient reports no problems.    Diet and Physical Activity  Diet/Nutrition: well balanced diet.   Exercise: walking and strength training exercises.      Depression Screening  PHQ-2/9 Depression Screening    Little interest or pleasure in doing things: 0 - not at all  Feeling down, depressed, or hopeless: 0 - not at all  PHQ-2 Score: 0  PHQ-2 Interpretation: Negative depression screen        General Health  Sleep: sleeps well.   Hearing: normal - bilateral.  Vision: no vision problems.   Dental: regular dental visits.       /GYN Health  Follows with gynecology? yes   Patient is: postmenopausal  Last menstrual period: n/a  Contraceptive method: menopause.    Advanced Care Planning  Do you have an advanced directive? no  Do you have a durable medical power of ? no  ACP document given to the patient? no     Review of Systems:     Review of Systems   Constitutional: Negative.    HENT: Negative.     Eyes: Negative.    Respiratory: Negative.     Cardiovascular: Negative.    Gastrointestinal: Negative.    Endocrine: Negative.    Genitourinary: Negative.    Musculoskeletal: Negative.    Skin: Negative.    Allergic/Immunologic: Negative.    Neurological: Negative.    Hematological: Negative.    Psychiatric/Behavioral: Negative.        Past Medical History:     Past Medical History:   Diagnosis Date    Allergic     Anesthesia complication     chronic foot pain takes tramadol prn    Blood clot associated with vein wall inflammation 2011    calf,(3) wrist-from an IV-on warfarin for 1 yr    Chest pain     last assessed 07/08/13    Chronic pain disorder     left foot-crushed nerve    Endometriosis     last assessed 06/10/14    History of ankle fracture     Hx of blood clots     positive genetic marker    PONV (postoperative nausea and vomiting)     Thoracic outlet syndrome     Vitamin D deficiency       Past Surgical History:     Past Surgical History:   Procedure Laterality Date    ANKLE SURGERY      tendon     BREAST SURGERY Left     cyst-1990's    COLONOSCOPY      DILATION AND CURETTAGE OF UTERUS      ENDOMETRIAL ABLATION      HERNIA REPAIR      INCISION AND DRAINAGE ANTERIOR THORAX      KNEE ARTHROSCOPY      tendon surgery    LAPAROSCOPY      LIVER BIOPSY      REDUCTION MAMMAPLASTY Bilateral     excessive tissue removed in axillary region    RESECTION RIBS EXTRAPLEURAL      1 st rib    SHOULDER  ARTHROSCOPY Right     labrum repair    UMBILICAL HERNIA REPAIR        Social History:     Social History     Socioeconomic History    Marital status: /Civil Union     Spouse name: None    Number of children: None    Years of education: None    Highest education level: None   Occupational History    None   Tobacco Use    Smoking status: Never     Passive exposure: Never    Smokeless tobacco: Never   Vaping Use    Vaping status: Never Used   Substance and Sexual Activity    Alcohol use: Never    Drug use: Never     Comment: prescribed tramadol    Sexual activity: None   Other Topics Concern    None   Social History Narrative    None     Social Determinants of Health     Financial Resource Strain: Not on file   Food Insecurity: Not on file   Transportation Needs: Not on file   Physical Activity: Not on file   Stress: Not on file   Social Connections: Not on file   Intimate Partner Violence: Not on file   Housing Stability: Not on file      Family History:     Family History   Problem Relation Age of Onset    No Known Problems Mother     Diabetes Father     Thyroid disease Sister     No Known Problems Sister     Stroke Maternal Grandmother     No Known Problems Maternal Grandfather     No Known Problems Paternal Grandmother     No Known Problems Paternal Grandfather     Thyroid disease Maternal Aunt     Breast cancer Maternal Aunt 29    No Known Problems Maternal Aunt     No Known Problems Maternal Aunt     Thyroid disease Cousin     Thyroid cancer Cousin 45    No Known Problems Son     No Known Problems Son     No Known Problems Paternal Aunt     No Known Problems Paternal Aunt     No Known Problems Paternal Aunt     No Known Problems Paternal Aunt     No Known Problems Paternal Aunt     Heart disease Neg Hx       Current Medications:     Current Outpatient Medications   Medication Sig Dispense Refill    cloNIDine (CATAPRES-TTS-2) 0.2 mg/24 hr if needed        lidocaine (XYLOCAINE) 5 % ointment   1    meloxicam  "(MOBIC) 15 mg tablet daily as needed      Multiple Vitamins-Minerals (multivitamin with minerals) tablet Take 1 tablet by mouth daily       No current facility-administered medications for this visit.      Allergies:     Allergies   Allergen Reactions    Alcohol - Food Allergy Vomiting     Ingested and topical-N/V-migraines-rash-has varying degrees of reactions      Physical Exam:     /90 (BP Location: Left arm, Patient Position: Sitting, Cuff Size: Standard)   Pulse 72   Temp 97.6 °F (36.4 °C) (Tympanic)   Resp 16   Ht 5' 4.33\" (1.634 m)   Wt 91.1 kg (200 lb 12.8 oz)   SpO2 97%   BMI 34.11 kg/m²     Physical Exam  Vitals and nursing note reviewed.   Constitutional:       Appearance: Normal appearance. She is well-developed.   HENT:      Head: Normocephalic and atraumatic.      Right Ear: External ear normal.      Left Ear: External ear normal.      Nose: Nose normal.   Eyes:      Extraocular Movements: Extraocular movements intact.      Conjunctiva/sclera: Conjunctivae normal.      Pupils: Pupils are equal, round, and reactive to light.   Cardiovascular:      Rate and Rhythm: Normal rate and regular rhythm.      Heart sounds: Normal heart sounds.   Pulmonary:      Effort: Pulmonary effort is normal.      Breath sounds: Normal breath sounds.   Abdominal:      General: Abdomen is flat. Bowel sounds are normal.      Palpations: Abdomen is soft.   Musculoskeletal:         General: Normal range of motion.      Cervical back: Normal range of motion and neck supple.   Skin:     General: Skin is warm and dry.      Capillary Refill: Capillary refill takes less than 2 seconds.   Neurological:      General: No focal deficit present.      Mental Status: She is alert and oriented to person, place, and time.   Psychiatric:         Mood and Affect: Mood normal.         Behavior: Behavior normal.         Thought Content: Thought content normal.         Judgment: Judgment normal.          DO KYLIE Lewis" [facility]

## 2024-05-16 PROBLEM — Z13.220 SCREENING, LIPID: Status: RESOLVED | Noted: 2022-02-08 | Resolved: 2024-05-16

## 2024-05-20 ENCOUNTER — TELEPHONE (OUTPATIENT)
Dept: FAMILY MEDICINE CLINIC | Facility: CLINIC | Age: 59
End: 2024-05-20

## 2024-05-20 NOTE — TELEPHONE ENCOUNTER
Patient came into the office and was asking about her last visit, you stated that her kidney function was not good. The patient was asking you why you would have said that. Plus she was asking why this wasn't brought up when she had the test done organically. Please advise.

## 2024-06-08 ENCOUNTER — APPOINTMENT (OUTPATIENT)
Dept: LAB | Facility: CLINIC | Age: 59
End: 2024-06-08
Payer: COMMERCIAL

## 2024-06-08 DIAGNOSIS — E55.9 VITAMIN D DEFICIENCY: ICD-10-CM

## 2024-06-08 DIAGNOSIS — R73.03 PRE-DIABETES: ICD-10-CM

## 2024-06-08 DIAGNOSIS — R73.03 PREDIABETES: ICD-10-CM

## 2024-06-08 LAB
25(OH)D3 SERPL-MCNC: 24.5 NG/ML (ref 30–100)
ALBUMIN SERPL BCP-MCNC: 4.3 G/DL (ref 3.5–5)
ALP SERPL-CCNC: 60 U/L (ref 34–104)
ALT SERPL W P-5'-P-CCNC: 14 U/L (ref 7–52)
ANION GAP SERPL CALCULATED.3IONS-SCNC: 4 MMOL/L (ref 4–13)
AST SERPL W P-5'-P-CCNC: 13 U/L (ref 13–39)
BILIRUB SERPL-MCNC: 0.46 MG/DL (ref 0.2–1)
BUN SERPL-MCNC: 18 MG/DL (ref 5–25)
CALCIUM SERPL-MCNC: 9.6 MG/DL (ref 8.4–10.2)
CHLORIDE SERPL-SCNC: 107 MMOL/L (ref 96–108)
CO2 SERPL-SCNC: 30 MMOL/L (ref 21–32)
CREAT SERPL-MCNC: 0.87 MG/DL (ref 0.6–1.3)
GFR SERPL CREATININE-BSD FRML MDRD: 73 ML/MIN/1.73SQ M
GLUCOSE P FAST SERPL-MCNC: 104 MG/DL (ref 65–99)
POTASSIUM SERPL-SCNC: 4.2 MMOL/L (ref 3.5–5.3)
PROT SERPL-MCNC: 7.3 G/DL (ref 6.4–8.4)
SODIUM SERPL-SCNC: 141 MMOL/L (ref 135–147)

## 2024-06-08 PROCEDURE — 80053 COMPREHEN METABOLIC PANEL: CPT

## 2024-06-08 PROCEDURE — 36415 COLL VENOUS BLD VENIPUNCTURE: CPT

## 2024-06-08 PROCEDURE — 82306 VITAMIN D 25 HYDROXY: CPT

## 2024-06-08 PROCEDURE — 83036 HEMOGLOBIN GLYCOSYLATED A1C: CPT

## 2024-06-09 LAB
EST. AVERAGE GLUCOSE BLD GHB EST-MCNC: 120 MG/DL
HBA1C MFR BLD: 5.8 %

## 2024-09-03 ENCOUNTER — HOSPITAL ENCOUNTER (OUTPATIENT)
Dept: MAMMOGRAPHY | Facility: HOSPITAL | Age: 59
Discharge: HOME | End: 2024-09-03
Payer: COMMERCIAL

## 2024-09-03 VITALS — WEIGHT: 200 LBS | BODY MASS INDEX: 34.15 KG/M2 | HEIGHT: 64 IN

## 2024-09-03 DIAGNOSIS — Z12.31 ENCOUNTER FOR SCREENING MAMMOGRAM FOR MALIGNANT NEOPLASM OF BREAST: ICD-10-CM

## 2024-09-03 PROCEDURE — 77063 BREAST TOMOSYNTHESIS BI: CPT

## 2024-09-03 PROCEDURE — 77067 SCR MAMMO BI INCL CAD: CPT

## 2024-11-29 ENCOUNTER — OCCMED (OUTPATIENT)
Dept: URGENT CARE | Age: 59
End: 2024-11-29
Payer: OTHER MISCELLANEOUS

## 2024-11-29 ENCOUNTER — APPOINTMENT (OUTPATIENT)
Dept: RADIOLOGY | Age: 59
End: 2024-11-29
Payer: OTHER MISCELLANEOUS

## 2024-11-29 DIAGNOSIS — S69.91XA RIGHT WRIST INJURY, INITIAL ENCOUNTER: ICD-10-CM

## 2024-11-29 DIAGNOSIS — S69.92XA INJURY OF LEFT WRIST, INITIAL ENCOUNTER: Primary | ICD-10-CM

## 2024-11-29 DIAGNOSIS — S69.92XA INJURY OF LEFT WRIST, INITIAL ENCOUNTER: ICD-10-CM

## 2024-11-29 PROCEDURE — 73110 X-RAY EXAM OF WRIST: CPT

## 2024-11-29 PROCEDURE — 99283 EMERGENCY DEPT VISIT LOW MDM: CPT

## 2024-11-29 PROCEDURE — G0382 LEV 3 HOSP TYPE B ED VISIT: HCPCS

## 2024-12-06 ENCOUNTER — APPOINTMENT (OUTPATIENT)
Age: 59
End: 2024-12-06
Payer: OTHER MISCELLANEOUS

## 2024-12-06 PROCEDURE — 99214 OFFICE O/P EST MOD 30 MIN: CPT | Performed by: PHYSICIAN ASSISTANT

## 2024-12-22 ENCOUNTER — HOSPITAL ENCOUNTER (OUTPATIENT)
Dept: RADIOLOGY | Facility: HOSPITAL | Age: 59
Discharge: HOME | End: 2024-12-22
Payer: OTHER MISCELLANEOUS

## 2024-12-22 DIAGNOSIS — S63.8X2D: ICD-10-CM

## 2024-12-22 DIAGNOSIS — S63.91XD SPRAIN OF HAND, RIGHT, SUBSEQUENT ENCOUNTER: ICD-10-CM

## 2024-12-22 PROCEDURE — 73221 MRI JOINT UPR EXTREM W/O DYE: CPT

## 2024-12-26 ENCOUNTER — RESULTS FOLLOW-UP (OUTPATIENT)
Dept: ADMINISTRATIVE | Facility: HOSPITAL | Age: 59
End: 2024-12-26

## 2025-01-02 ENCOUNTER — APPOINTMENT (OUTPATIENT)
Age: 60
End: 2025-01-02
Payer: OTHER MISCELLANEOUS

## 2025-01-02 PROCEDURE — 99214 OFFICE O/P EST MOD 30 MIN: CPT | Performed by: PHYSICIAN ASSISTANT

## 2025-01-13 ENCOUNTER — OFFICE VISIT (OUTPATIENT)
Dept: OBGYN CLINIC | Facility: CLINIC | Age: 60
End: 2025-01-13
Payer: OTHER MISCELLANEOUS

## 2025-01-13 VITALS — BODY MASS INDEX: 34.15 KG/M2 | HEIGHT: 64 IN | WEIGHT: 200 LBS

## 2025-01-13 DIAGNOSIS — M25.831 ULNAR IMPACTION SYNDROME, RIGHT: ICD-10-CM

## 2025-01-13 DIAGNOSIS — M25.832 ULNAR IMPACTION SYNDROME, LEFT: Primary | ICD-10-CM

## 2025-01-13 DIAGNOSIS — S63.592A TFCC (TRIANGULAR FIBROCARTILAGE COMPLEX) TEAR, LEFT, INITIAL ENCOUNTER: ICD-10-CM

## 2025-01-13 DIAGNOSIS — S63.591A TFCC (TRIANGULAR FIBROCARTILAGE COMPLEX) TEAR, RIGHT, INITIAL ENCOUNTER: ICD-10-CM

## 2025-01-13 PROCEDURE — 99213 OFFICE O/P EST LOW 20 MIN: CPT | Performed by: ORTHOPAEDIC SURGERY

## 2025-01-13 PROCEDURE — 20605 DRAIN/INJ JOINT/BURSA W/O US: CPT | Performed by: ORTHOPAEDIC SURGERY

## 2025-01-13 RX ORDER — BETAMETHASONE SODIUM PHOSPHATE AND BETAMETHASONE ACETATE 3; 3 MG/ML; MG/ML
6 INJECTION, SUSPENSION INTRA-ARTICULAR; INTRALESIONAL; INTRAMUSCULAR; SOFT TISSUE
Status: COMPLETED | OUTPATIENT
Start: 2025-01-13 | End: 2025-01-13

## 2025-01-13 RX ORDER — LIDOCAINE HYDROCHLORIDE 10 MG/ML
1 INJECTION, SOLUTION INFILTRATION; PERINEURAL
Status: COMPLETED | OUTPATIENT
Start: 2025-01-13 | End: 2025-01-13

## 2025-01-13 RX ADMIN — LIDOCAINE HYDROCHLORIDE 1 ML: 10 INJECTION, SOLUTION INFILTRATION; PERINEURAL at 13:30

## 2025-01-13 RX ADMIN — BETAMETHASONE SODIUM PHOSPHATE AND BETAMETHASONE ACETATE 6 MG: 3; 3 INJECTION, SUSPENSION INTRA-ARTICULAR; INTRALESIONAL; INTRAMUSCULAR; SOFT TISSUE at 13:30

## 2025-01-13 NOTE — LETTER
January 13, 2025     Patient: Che Ascencio  YOB: 1965  Date of Visit: 1/13/2025      To Whom it May Concern:    Che Ascencio is under my professional care. Che was seen in my office on 1/13/2025. Che may return to work on 1/13/25 with no restrictions .    If you have any questions or concerns, please don't hesitate to call.         Sincerely,          Stan Bonilla MD        CC: No Recipients

## 2025-01-13 NOTE — PROGRESS NOTES
ORTHOPAEDIC HAND, WRIST, AND ELBOW OFFICE  VISIT     Name: Che Ascencio      : 1965      MRN: [id number]  Encounter Provider: Stan Bonilla MD  Encounter Date: 2025   Encounter department: [facility]  :  Assessment & Plan  Ulnar impaction syndrome, left  Discussed with the patient the conservative treatment to include bracing with activity, activity modification, CS injections and physical therapy.   Patient was offered and accepted CS injection to bilateral radiocarpal joints at time of visit, she tolerated the procedure well  Referral to hand therapy was placed at time of visit  Patient will follow-up in office in 8 weeks for re-evaluation of symptoms  The patient expresses understanding and is in agreement with today's treatment plan.  Orders:  •  Medium joint arthrocentesis: R radiocarpal  •  Ambulatory Referral to PT/OT Hand Therapy; Future  •  lidocaine (XYLOCAINE) 1 % injection 1 mL  •  betamethasone acetate-betamethasone sodium phosphate (CELESTONE) injection 6 mg    Scribe Attestation    I,:  Rimma Dsouza am acting as a scribe while in the presence of the attending physician.:       I,:  Stan Bonilla MD personally performed the services described in this documentation    as scribed in my presence.:           Ulnar impaction syndrome, right    Orders:  •  Medium joint arthrocentesis: R radiocarpal  •  Ambulatory Referral to PT/OT Hand Therapy; Future  •  lidocaine (XYLOCAINE) 1 % injection 1 mL  •  betamethasone acetate-betamethasone sodium phosphate (CELESTONE) injection 6 mg    TFCC (triangular fibrocartilage complex) tear, left, initial encounter    Orders:  •  Medium joint arthrocentesis: R radiocarpal  •  Ambulatory Referral to PT/OT Hand Therapy; Future  •  lidocaine (XYLOCAINE) 1 % injection 1 mL  •  betamethasone acetate-betamethasone sodium phosphate (CELESTONE) injection 6 mg    TFCC (triangular fibrocartilage complex) tear,  "right, initial encounter    Orders:  •  Medium joint arthrocentesis: R radiocarpal  •  Ambulatory Referral to PT/OT Hand Therapy; Future  •  lidocaine (XYLOCAINE) 1 % injection 1 mL  •  betamethasone acetate-betamethasone sodium phosphate (CELESTONE) injection 6 mg        General Discussions:       Operative Discussions:       History of Present Illness   HPI  Chief Complaint   Patient presents with   • Left Wrist - Pain     MRI - 12/22  XR - 11/29   • Right Wrist - Pain     MRI - 12/22  XR - 11/29       Che Ascencio is a 59 y.o. female who presents with bilateral wrist pain. She states that this started approximately 6 months ago. She states that most of her pain occurs with pushing or pulling motion that creates a stabbing sensation into her bilateral wrists. She notes bilateral weakness with left being worse than the right.       REVIEW OF SYSTEMS:  General: no fever, no chills  HEENT:  No loss of hearing or eyesight problems  Eyes:  No red eyes  Respiratory:  No coughing, shortness of breath or wheezing  Cardiovascular:  No chest pain, no palpitations  GI:  Abdomen soft nontender, denies nausea  Endocrine:  No muscle weakness, no frequent urination, no excessive thirst  Urinary:  No dysuria, no incontinence  Musculoskeletal: see HPI and PE  SKIN:  No skin rash, no dry skin  Neurological:  No headaches, no confusion  Psychiatric:  No suicide thoughts, no anxiety, no depression  Review of all other systems is negative    Objective   Ht 5' 4\" (1.626 m)   Wt 90.7 kg (200 lb)   BMI 34.33 kg/m²      General: well developed and well nourished, alert, oriented times 3, and appears comfortable  Psychiatric: Normal  HEENT: Trachea Midline, No torticollis  Cardiovascular: No discernable arrhythmia  Pulmonary: No wheezing or stridor  Abdomen: No rebound or guarding  Extremities: No peripheral edema  Skin: No masses, erythema, lacerations, fluctation, ulcerations  Neurovascular: Sensation Intact to the Median, Ulnar, " "Radial Nerve, Motor Intact to the Median, Ulnar, Radial Nerve, and Pulses Intact    Musculoskeletal exam:  Bilateral wrist:  WNL and pain free with PROM in wrist extension and flexion, supination and pronation, negative crepitation, DRUJ without pain and stable, TTP to radial side of lunate, ulnar side of lunate, prestyloid recess and ECU insertion, NTTP of SL ligament, ST interval, capitolunate joint, Negative LT shuck, Negative TFCC circumduction , minimally + synergy test bilaterally      STUDIES REVIEWED:  Images were reviewed in PACS by Dr. Bonilla and demonstrate: Lateral, and oblique x-ray of the wrist reviewed today demonstrates no evidence of fractures or dislocations, there is some cystic formation to the ulnar side of the lunate.  Patient also has changes on her MRI which reviewed of the bilateral wrist which are consistent with TFCC tear on the right as well as chondral changes to the ulnar aspect of the lunate bilaterally.      PROCEDURES PERFORMED:  Medium joint arthrocentesis: R radiocarpal  Universal Protocol:  Consent given by: patient  Time out: Immediately prior to procedure a \"time out\" was called to verify the correct patient, procedure, equipment, support staff and site/side marked as required.  Supporting Documentation  Indications: pain   Procedure Details  Location: wrist - R radiocarpal  Preparation: Patient was prepped and draped in the usual sterile fashion  Needle size: 25 G  Ultrasound guidance: no  Approach: dorsal  Medications administered: 1 mL lidocaine 1 %; 6 mg betamethasone acetate-betamethasone sodium phosphate 6 (3-3) mg/mL    Patient tolerance: patient tolerated the procedure well with no immediate complications  Dressing:  Sterile dressing applied             "

## 2025-01-14 NOTE — PROGRESS NOTES
OT Evaluation     Today's date: 1/15/2025  Patient name: Che Ascencio  : 1965  MRN: [id number]  Referring provider: Stan Bonilla MD  Dx:   Encounter Diagnosis     ICD-10-CM    1. Ulnar impaction syndrome, left  M25.832 Ambulatory Referral to PT/OT Hand Therapy      2. Ulnar impaction syndrome, right  M25.831 Ambulatory Referral to PT/OT Hand Therapy      3. TFCC (triangular fibrocartilage complex) tear, left, subsequent encounter  S63.592D       4. TFCC (triangular fibrocartilage complex) tear, right, subsequent encounter  S63.591D       5. TFCC (triangular fibrocartilage complex) tear, left, initial encounter  S63.592A Ambulatory Referral to PT/OT Hand Therapy      6. TFCC (triangular fibrocartilage complex) tear, right, initial encounter  S63.591A Ambulatory Referral to PT/OT Hand Therapy          Start Time: 0705  Stop Time: 0800  Total time in clinic (min): 55 minutes    Assessment  Impairments: impaired physical strength, lacks appropriate home exercise program and pain with function    Assessment details: Pt presents for OT eval with B ulnar impaction syndrome and TFCC tears. Subjectively, she has mild to moderate pain in both wrists, worse on the L vs R. She reports good improvement with pain since her injection. Pt's pain is consistently on the ulnar aspect of her wrists with provocative testing. She reports most of her pain with resisted pronation and resisted wrist motions all directions. She has pain with release of the  dynamometer. Reviewed forearm and wrist isometrics and activity modifications. Recommend therapy 1x/week for 6 weeks to trial conservative management. If no improvement by next session, may fabricate wrist orthoses to manage pain. Pt understands/agrees with current POC.   Understanding of Dx/Px/POC: good     Prognosis: good    Goals  Short term goals:  To be achieved within 2-3 visits from start of care on 01/15/25       Patient will demonstrate independence with  recommended AROM and stretching HEPs.   Patient will demonstrate independence with postural/positional awareness and /ergonomic correction recommendations.  Patient will be instructed on the benefits and effective use of appropriate modalities for edema control, soft-tissue extensibility, and pain management.   Patient will be instructed on the benefits and effective use of appropriate adaptive equipment and/or bracing devices.   Patient will be report improved functional participation with implementation of recommended pacing, activity modifications, and symptom management strategies.     Long term goals:  To be achieved at time of discharge:   Patient will report decreased pain throughout normal roles/routines to <1/10.   Patient will report improved ability to complete ADLs/IADLs compared to IE.   Patient will demonstrate improved  and pinch strength to within 10% of the uninvolved side for increased readiness to return to gym and normal work routine.   Patient will report readiness for d/c from OT.         Plan  Patient would benefit from: custom splinting and skilled occupational therapy  Referral necessary: Yes  Planned modality interventions: thermotherapy: hydrocollator packs    Planned therapy interventions: IADL retraining, activity modification, ADL retraining, behavior modification, body mechanics training, flexibility, functional ROM exercises, graded activity, graded exercise, home exercise program, therapeutic exercise, therapeutic activities, stretching, strengthening, orthotic fitting/training, orthotic management and training, patient education, manual therapy and joint mobilization    Frequency: 1x week  Duration in weeks: 6  Plan of Care beginning date: 1/15/2025  Plan of Care expiration date: 4/30/2025  Treatment plan discussed with: patient      Subjective Evaluation    History of Present Illness  Mechanism of injury: Pt is a 60 yo RHD female who presents to OT for eval with dx  of B ulnar impaction syndrome and R TFCC tear; L SL tear confirmed with MRI. Pt referred to OT for conservative management. She did receive injections which are helping a great deal. Most of her pain was on the left. Pain started in middle of November when she was doing a demonstration for pushups and tricep dips.     Occupational Profile  ADLs: no issues    IADLs: sore with cooking, cutting food etc.    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      Patient Goals  Patient goals for therapy: increased strength, independence with ADLs/IADLs, return to sport/leisure activities, return to work, increased motion and decreased pain    Pain  Current pain ratin  At best pain ratin  At worst pain ratin  Quality: dull ache  Progression: improved    Treatments  Previous treatment: injection treatment  Current treatment: occupational therapy      Objective    Tissue Integrity: n/a    Sensation (Ten-Test )  Right Hand (thumb to small finger): 10/10, 10/10, 10/10, 10/10, 10/10  Left Hand (thumb to small finger): 10/10, 10/10, 10/10, 10/10, 10/10    Special Tests:     Mild pain with resisted pronation  Mild pain with resisted wrist flexion, extension, radial/ulnar deviation  Pain with resisted digit flexion and forearm supination  Mild discomfort with Funes shift test, no apparent instability appreciated      Edema (circumferential) (cm):    Right Left   Wrist crease 15.7 15.6       AROM       Wrist   Right Left   Flexion 60 65   Extension 55 55   Radial Dev. 17 20   Ulnar Dev. 28 22       /Pinch Strength  Dynamometer R UE  L UE comments   Position #2 (lbs) 80.7 neutral  67.8 supinated  69.6 pronated 66.3 neutral  70.8 supinated  55.7 pronated            Precautions: B ulnar impaction syndrome with TFCC tear    Manuals  1/15 eval                 STM/IASTM B  forearm flex/ext                                                                                        Ther Ex                       Forearm and wrist AROM                  Forearm and wrist isometrics              Static      Progress to dynamic  in various positions                                                                                                                                                              Ther Activity                   Weight bearing                   Activity modification                                                                                                    Neuro Re-Ed                                                               Ortho Fit                  [place] PRN                                             Modalities                  HP PRN  did not want heat

## 2025-01-15 ENCOUNTER — EVALUATION (OUTPATIENT)
Dept: OCCUPATIONAL THERAPY | Age: 60
End: 2025-01-15
Payer: OTHER MISCELLANEOUS

## 2025-01-15 DIAGNOSIS — S63.591A TFCC (TRIANGULAR FIBROCARTILAGE COMPLEX) TEAR, RIGHT, INITIAL ENCOUNTER: ICD-10-CM

## 2025-01-15 DIAGNOSIS — M25.832 ULNAR IMPACTION SYNDROME, LEFT: Primary | ICD-10-CM

## 2025-01-15 DIAGNOSIS — S63.591D TFCC (TRIANGULAR FIBROCARTILAGE COMPLEX) TEAR, RIGHT, SUBSEQUENT ENCOUNTER: ICD-10-CM

## 2025-01-15 DIAGNOSIS — M25.831 ULNAR IMPACTION SYNDROME, RIGHT: ICD-10-CM

## 2025-01-15 DIAGNOSIS — S63.592A TFCC (TRIANGULAR FIBROCARTILAGE COMPLEX) TEAR, LEFT, INITIAL ENCOUNTER: ICD-10-CM

## 2025-01-15 DIAGNOSIS — S63.592D TFCC (TRIANGULAR FIBROCARTILAGE COMPLEX) TEAR, LEFT, SUBSEQUENT ENCOUNTER: ICD-10-CM

## 2025-01-15 PROCEDURE — 97166 OT EVAL MOD COMPLEX 45 MIN: CPT

## 2025-01-15 PROCEDURE — 97110 THERAPEUTIC EXERCISES: CPT

## 2025-01-22 ENCOUNTER — OFFICE VISIT (OUTPATIENT)
Dept: OCCUPATIONAL THERAPY | Age: 60
End: 2025-01-22
Payer: OTHER MISCELLANEOUS

## 2025-01-22 DIAGNOSIS — S63.592D TFCC (TRIANGULAR FIBROCARTILAGE COMPLEX) TEAR, LEFT, SUBSEQUENT ENCOUNTER: ICD-10-CM

## 2025-01-22 DIAGNOSIS — M25.832 ULNAR IMPACTION SYNDROME, LEFT: Primary | ICD-10-CM

## 2025-01-22 DIAGNOSIS — S63.591A TFCC (TRIANGULAR FIBROCARTILAGE COMPLEX) TEAR, RIGHT, INITIAL ENCOUNTER: ICD-10-CM

## 2025-01-22 DIAGNOSIS — M25.831 ULNAR IMPACTION SYNDROME, RIGHT: ICD-10-CM

## 2025-01-22 DIAGNOSIS — S63.592A TFCC (TRIANGULAR FIBROCARTILAGE COMPLEX) TEAR, LEFT, INITIAL ENCOUNTER: ICD-10-CM

## 2025-01-22 PROCEDURE — 97140 MANUAL THERAPY 1/> REGIONS: CPT

## 2025-01-22 PROCEDURE — 97110 THERAPEUTIC EXERCISES: CPT

## 2025-01-22 NOTE — PROGRESS NOTES
"Daily Note     Today's date: 2025  Patient name: Che Ascencio  : 1965  MRN: [id number]  Referring provider: Stan Bonilla MD  Dx:   Encounter Diagnosis     ICD-10-CM    1. Ulnar impaction syndrome, left  M25.832       2. Ulnar impaction syndrome, right  M25.831       3. TFCC (triangular fibrocartilage complex) tear, left, subsequent encounter  S63.592D       4. TFCC (triangular fibrocartilage complex) tear, left, initial encounter  S63.592A       5. TFCC (triangular fibrocartilage complex) tear, right, initial encounter  S63.591A           Start Time: 0700  Stop Time: 0750  Total time in clinic (min): 50 minutes    Subjective: \"It seemed to have gotten a bit worse over the past week.\"      Objective: See treatment diary below      Assessment: Tolerated treatment well. Pt tolerated progression with forearm and wrist strengthening today. Will see how she tolerates isotonic exercises. May progress to dynamic strengthening if she tolerates it well. Pt requires verbal and physical cues for proper positioning during exercises. Patient would benefit from continued OT      Plan: Continue per plan of care.  Progress treatment as tolerated.       Precautions: B ulnar impaction syndrome with TFCC tear    Manuals  1/15 eval                 STM/IASTM B forearm flex/ext    20'                                                                                    Ther Ex                       Forearm and wrist AROM    x20              Forearm and wrist isometrics  5x5s isometrics             Static      Progress to dynamic  in various positions  Small hammer pronation/supination x20    Wrist curls x10 each direction 2lb                                                                                                                                                            Ther Activity                   Weight bearing                   Activity modification                                               "                                                      Neuro Re-Ed                                                               Ortho Fit                  [place] PRN                                             Modalities                  HP PRN  did not want heat  5'

## 2025-01-29 ENCOUNTER — OFFICE VISIT (OUTPATIENT)
Dept: OCCUPATIONAL THERAPY | Age: 60
End: 2025-01-29
Payer: OTHER MISCELLANEOUS

## 2025-01-29 DIAGNOSIS — S63.591D TFCC (TRIANGULAR FIBROCARTILAGE COMPLEX) TEAR, RIGHT, SUBSEQUENT ENCOUNTER: ICD-10-CM

## 2025-01-29 DIAGNOSIS — S63.592D TFCC (TRIANGULAR FIBROCARTILAGE COMPLEX) TEAR, LEFT, SUBSEQUENT ENCOUNTER: ICD-10-CM

## 2025-01-29 DIAGNOSIS — M25.831 ULNAR IMPACTION SYNDROME, RIGHT: ICD-10-CM

## 2025-01-29 DIAGNOSIS — M25.832 ULNAR IMPACTION SYNDROME, LEFT: Primary | ICD-10-CM

## 2025-01-29 PROCEDURE — 97110 THERAPEUTIC EXERCISES: CPT

## 2025-01-29 PROCEDURE — 97140 MANUAL THERAPY 1/> REGIONS: CPT

## 2025-01-29 NOTE — PROGRESS NOTES
"Daily Note     Today's date: 2025  Patient name: Che Ascencio  : 1965  MRN: [id number]  Referring provider: Stan Bonilla MD  Dx:   Encounter Diagnosis     ICD-10-CM    1. Ulnar impaction syndrome, left  M25.832       2. Ulnar impaction syndrome, right  M25.831       3. TFCC (triangular fibrocartilage complex) tear, left, subsequent encounter  S63.592D       4. TFCC (triangular fibrocartilage complex) tear, right, subsequent encounter  S63.591D           Start Time: 0700  Stop Time: 0753  Total time in clinic (min): 53 minutes    Subjective: \"I can feel pain with weight bearing or opening a jar. It's not constant but it's worse than last week. I think the injection is wearing off.\"      Objective: See treatment diary below      Assessment: Tolerated treatment well. Pt tolerated progression with forearm and wrist strengthening, added dynamic exercises. Will start weight bearing progression over the next week. Patient would benefit from continued OT      Plan: Continue per plan of care.  Progress treatment as tolerated.       Precautions: B ulnar impaction syndrome with TFCC tear    Manuals  1/15 eval               STM/IASTM B forearm flex/ext    20'  25'                                                                                  Ther Ex                       Forearm and wrist AROM    x20  x20            Forearm and wrist isometrics  5x5s isometrics  5x5s isometrics           Static      Progress to dynamic  in various positions  Small hammer pronation/supination x20    Wrist curls x10 each direction 2lb Yellow hammer pronation/supination x20    Wrist curls x10 each direction 3lb    Green flex bar bend/twist  x30                                                                                                                                                           Ther Activity                   Weight bearing                   Activity modification                            "                                                                         Neuro Re-Ed                                                               Ortho Fit                  [place] PRN                                             Modalities                  HP PRN  did not want heat  5'  5'

## 2025-02-05 ENCOUNTER — OFFICE VISIT (OUTPATIENT)
Dept: OCCUPATIONAL THERAPY | Age: 60
End: 2025-02-05
Payer: OTHER MISCELLANEOUS

## 2025-02-05 DIAGNOSIS — M25.831 ULNAR IMPACTION SYNDROME, RIGHT: ICD-10-CM

## 2025-02-05 DIAGNOSIS — S63.591A TFCC (TRIANGULAR FIBROCARTILAGE COMPLEX) TEAR, RIGHT, INITIAL ENCOUNTER: ICD-10-CM

## 2025-02-05 DIAGNOSIS — S63.592A TFCC (TRIANGULAR FIBROCARTILAGE COMPLEX) TEAR, LEFT, INITIAL ENCOUNTER: ICD-10-CM

## 2025-02-05 DIAGNOSIS — S63.592D TFCC (TRIANGULAR FIBROCARTILAGE COMPLEX) TEAR, LEFT, SUBSEQUENT ENCOUNTER: ICD-10-CM

## 2025-02-05 DIAGNOSIS — S63.591D TFCC (TRIANGULAR FIBROCARTILAGE COMPLEX) TEAR, RIGHT, SUBSEQUENT ENCOUNTER: ICD-10-CM

## 2025-02-05 DIAGNOSIS — M25.832 ULNAR IMPACTION SYNDROME, LEFT: Primary | ICD-10-CM

## 2025-02-05 PROCEDURE — 97140 MANUAL THERAPY 1/> REGIONS: CPT

## 2025-02-05 PROCEDURE — 97110 THERAPEUTIC EXERCISES: CPT

## 2025-02-05 NOTE — PROGRESS NOTES
"Daily Note     Today's date: 2025  Patient name: Che Ascencio  : 1965  MRN: [id number]  Referring provider: Stan Bonilla MD  Dx:   Encounter Diagnosis     ICD-10-CM    1. Ulnar impaction syndrome, left  M25.832       2. Ulnar impaction syndrome, right  M25.831       3. TFCC (triangular fibrocartilage complex) tear, left, subsequent encounter  S63.592D       4. TFCC (triangular fibrocartilage complex) tear, right, subsequent encounter  S63.591D       5. TFCC (triangular fibrocartilage complex) tear, left, initial encounter  S63.592A       6. TFCC (triangular fibrocartilage complex) tear, right, initial encounter  S63.591A           Start Time: 710  Stop Time: 40  Total time in clinic (min): 30 minutes    Subjective: \"It's sore like it was before I started.\"      Objective: See treatment diary below      Assessment: Tolerated treatment well. Pt reports she is more sore today compared to last week. Backed off on strengthening today to see if the exercises were causing increased soreness. Reviewed AROM and stretches. Trialed K-tape with ulnar boost/support to see if that helps alleviate any pain. Patient would benefit from continued OT      Plan: Continue per plan of care.  Progress treatment as tolerated.       Precautions: B ulnar impaction syndrome with TFCC tear    Manuals  1/15 eval    2/5           STM/IASTM B forearm flex/ext    20'  25'  15'                                                                                Ther Ex                       Forearm and wrist AROM    x20  x20 X20    Gentle wrist stretches          Forearm and wrist isometrics  5x5s isometrics  5x5s isometrics           Static      Progress to dynamic  in various positions  Small hammer pronation/supination x20    Wrist curls x10 each direction 2lb Yellow hammer pronation/supination x20    Wrist curls x10 each direction 3lb    Green flex bar bend/twist  x30                                            "                                                                                                                Ther Activity                   Weight bearing                   Activity modification                                                                                                    Neuro Re-Ed                                                               Ortho Fit                  [place] PRN                                             Modalities                  HP PRN  did not want heat  5'  5'

## 2025-02-12 ENCOUNTER — APPOINTMENT (OUTPATIENT)
Dept: OCCUPATIONAL THERAPY | Age: 60
End: 2025-02-12
Payer: OTHER MISCELLANEOUS

## 2025-02-19 ENCOUNTER — OFFICE VISIT (OUTPATIENT)
Dept: OCCUPATIONAL THERAPY | Age: 60
End: 2025-02-19
Payer: OTHER MISCELLANEOUS

## 2025-02-19 DIAGNOSIS — M25.832 ULNAR IMPACTION SYNDROME, LEFT: Primary | ICD-10-CM

## 2025-02-19 DIAGNOSIS — M25.831 ULNAR IMPACTION SYNDROME, RIGHT: ICD-10-CM

## 2025-02-19 DIAGNOSIS — S63.592D TFCC (TRIANGULAR FIBROCARTILAGE COMPLEX) TEAR, LEFT, SUBSEQUENT ENCOUNTER: ICD-10-CM

## 2025-02-19 DIAGNOSIS — S63.591A TFCC (TRIANGULAR FIBROCARTILAGE COMPLEX) TEAR, RIGHT, INITIAL ENCOUNTER: ICD-10-CM

## 2025-02-19 DIAGNOSIS — S63.592A TFCC (TRIANGULAR FIBROCARTILAGE COMPLEX) TEAR, LEFT, INITIAL ENCOUNTER: ICD-10-CM

## 2025-02-19 DIAGNOSIS — S63.591D TFCC (TRIANGULAR FIBROCARTILAGE COMPLEX) TEAR, RIGHT, SUBSEQUENT ENCOUNTER: ICD-10-CM

## 2025-02-19 PROCEDURE — 97110 THERAPEUTIC EXERCISES: CPT

## 2025-02-19 PROCEDURE — 97140 MANUAL THERAPY 1/> REGIONS: CPT

## 2025-02-19 NOTE — PROGRESS NOTES
"Daily Note     Today's date: 2025  Patient name: Che Ascencio  : 1965  MRN: [id number]  Referring provider: Stan Bonilla MD  Dx:   Encounter Diagnosis     ICD-10-CM    1. Ulnar impaction syndrome, left  M25.832       2. Ulnar impaction syndrome, right  M25.831       3. TFCC (triangular fibrocartilage complex) tear, left, subsequent encounter  S63.592D       4. TFCC (triangular fibrocartilage complex) tear, right, subsequent encounter  S63.591D       5. TFCC (triangular fibrocartilage complex) tear, left, initial encounter  S63.592A       6. TFCC (triangular fibrocartilage complex) tear, right, initial encounter  S63.591A           Start Time: 708  Stop Time: 748  Total time in clinic (min): 40 minutes    Subjective: \"It hurts when I push off of it.\"      Objective: See treatment diary below      Assessment: Tolerated treatment well. Last session pt had some soreness so we backed off of strengthening. She presents today with slightly less soreness however continues to report pain with weightbearing. Reintroduced gentle strengthening to see how it feels. Trialed Circumferential K-taping to see if that helps any symptoms. Patient would benefit from continued OT      Plan: Continue per plan of care.  Progress treatment as tolerated.       Precautions: B ulnar impaction syndrome with TFCC tear    Manuals  1/15 eval    2         STM/IASTM B forearm flex/ext    20'  25'  15'  10'             K-tape applied                                                                 Ther Ex                       Forearm and wrist AROM    x20  x20 X20    Gentle wrist stretches  X20    Gentle wrist stretches        Forearm and wrist isometrics  5x5s isometrics  5x5s isometrics           Static      Progress to dynamic  in various positions  Small hammer pronation/supination x20    Wrist curls x10 each direction 2lb Yellow hammer pronation/supination x20    Wrist curls x10 each direction " 3lb    Green flex bar bend/twist  x30   Yellow hammer pronation/supination x20    Wrist curls x10 each direction 2lb    Green flex bar bend/twist  x30                                                                                                                                                        Ther Activity                   Weight bearing                   Activity modification                                                                                                    Neuro Re-Ed                                                               Ortho Fit                  [place] PRN                                             Modalities                  HP PRN  did not want heat  5'  5'    5'

## 2025-02-25 NOTE — PROGRESS NOTES
"Daily Note     Today's date: 2025  Patient name: Che Ascencio  : 1965  MRN: [id number]  Referring provider: Stan Bonilla MD  Dx:   Encounter Diagnosis     ICD-10-CM    1. Ulnar impaction syndrome, left  M25.832       2. Ulnar impaction syndrome, right  M25.831       3. TFCC (triangular fibrocartilage complex) tear, left, subsequent encounter  S63.592D       4. TFCC (triangular fibrocartilage complex) tear, right, subsequent encounter  S63.591D       5. TFCC (triangular fibrocartilage complex) tear, left, initial encounter  S63.592A       6. TFCC (triangular fibrocartilage complex) tear, right, initial encounter  S63.591A           Start Time: 714  Stop Time: 0754  Total time in clinic (min): 40 minutes    Subjective: \"It comes and goes.\"      Objective: See treatment diary below      Assessment: Tolerated treatment well. Pt continues with bilateral intermittent pain at the ulnar wrist with weight bearing, gripping, and holding various objects. She has f/u with ortho this morning. Patient would benefit from continued OT      Plan: Continue per plan of care.  Progress treatment as tolerated.       Precautions: B ulnar impaction syndrome with TFCC tear    Manuals  1/15 eval          STM/IASTM B forearm flex/ext    20'  25'  15'  10' 15'            K-tape applied                                                                 Ther Ex                       Forearm and wrist AROM    x20  x20 X20    Gentle wrist stretches  X20    Gentle wrist stretches X20    Gentle wrist stretches       Forearm and wrist isometrics  5x5s isometrics  5x5s isometrics           Static      Progress to dynamic  in various positions  Small hammer pronation/supination x20    Wrist curls x10 each direction 2lb Yellow hammer pronation/supination x20    Wrist curls x10 each direction 3lb    Green flex bar bend/twist  x30   Yellow hammer pronation/supination x20    Wrist curls x10 each " direction 2lb    Green flex bar bend/twist  x30 Yellow hammer pronation/supination x20    Wrist curls x10 each direction 2lb    Green flex bar bend/twist  x30                                                                                                                                                       Ther Activity                   Weight bearing                   Activity modification                                                                                                    Neuro Re-Ed                                                               Ortho Fit                  [place] PRN                                             Modalities                  HP PRN  did not want heat  5'  5'    5'

## 2025-02-26 ENCOUNTER — OFFICE VISIT (OUTPATIENT)
Dept: OBGYN CLINIC | Facility: HOSPITAL | Age: 60
End: 2025-02-26
Payer: OTHER MISCELLANEOUS

## 2025-02-26 ENCOUNTER — OFFICE VISIT (OUTPATIENT)
Dept: OCCUPATIONAL THERAPY | Age: 60
End: 2025-02-26
Payer: OTHER MISCELLANEOUS

## 2025-02-26 VITALS — WEIGHT: 205.8 LBS | BODY MASS INDEX: 35.13 KG/M2 | HEIGHT: 64 IN

## 2025-02-26 DIAGNOSIS — M25.831 ULNAR IMPACTION SYNDROME, RIGHT: ICD-10-CM

## 2025-02-26 DIAGNOSIS — M25.832 ULNAR IMPACTION SYNDROME, LEFT: Primary | ICD-10-CM

## 2025-02-26 DIAGNOSIS — S63.591A TFCC (TRIANGULAR FIBROCARTILAGE COMPLEX) TEAR, RIGHT, INITIAL ENCOUNTER: ICD-10-CM

## 2025-02-26 DIAGNOSIS — S63.591D TFCC (TRIANGULAR FIBROCARTILAGE COMPLEX) TEAR, RIGHT, SUBSEQUENT ENCOUNTER: ICD-10-CM

## 2025-02-26 DIAGNOSIS — S63.592A TFCC (TRIANGULAR FIBROCARTILAGE COMPLEX) TEAR, LEFT, INITIAL ENCOUNTER: ICD-10-CM

## 2025-02-26 DIAGNOSIS — S63.592D TFCC (TRIANGULAR FIBROCARTILAGE COMPLEX) TEAR, LEFT, SUBSEQUENT ENCOUNTER: ICD-10-CM

## 2025-02-26 DIAGNOSIS — S63.592A TFCC (TRIANGULAR FIBROCARTILAGE COMPLEX) TEAR, LEFT, INITIAL ENCOUNTER: Primary | ICD-10-CM

## 2025-02-26 PROCEDURE — 97110 THERAPEUTIC EXERCISES: CPT

## 2025-02-26 PROCEDURE — 99213 OFFICE O/P EST LOW 20 MIN: CPT | Performed by: PHYSICIAN ASSISTANT

## 2025-02-26 PROCEDURE — 97140 MANUAL THERAPY 1/> REGIONS: CPT

## 2025-02-26 NOTE — PROGRESS NOTES
ORTHOPAEDIC HAND, WRIST, AND ELBOW OFFICE  VISIT     Name: Che Ascencio      : 1965      MRN: [id number]  Encounter Provider: Justice South PA-C  Encounter Date: 2025   Encounter department: [facility]  :  Assessment & Plan  TFCC (triangular fibrocartilage complex) tear, left, initial encounter  It was discussed with the patient that we will continue conservative treatment at this time  She was advised to start taking meloxicam on a daily basis  She was advised to return to her activities without restrictions  She will follow-up in 6-8 weeks for reevaluation and possibly discuss surgical intervention with a diagnostic wrist arthroscopy with possible TFCC debridement versus repair       TFCC (triangular fibrocartilage complex) tear, right, initial encounter  It was discussed with the patient that we will continue conservative treatment at this time  She was advised to start taking meloxicam on a daily basis  She was advised to return to her activities without restrictions  She will follow-up in 6-8 weeks for reevaluation and possibly discuss surgical intervention with a diagnostic wrist arthroscopy with possible TFCC debridement versus repair           History of Present Illness   HPI  Chief Complaint   Patient presents with   • Right Wrist - Follow-up     Radiocarpal injection- Beta- 25   • Left Wrist - Follow-up     Radiocarpal injection- Beta- 25       Che Ascencio is a 60 y.o. female who presents for follow-up regarding bilateral wrist pain.  She states that the cortisone injections lasted for about 2 weeks.  She started to note pain and discomfort then in the wrist.  She states that the sharp shooting pains have subsided but will still have them from time to time.  She has been going to therapy and working on her range of motion.      REVIEW OF SYSTEMS:  General: no fever, no chills  HEENT:  No loss of hearing or eyesight problems  Eyes:   "No red eyes  Respiratory:  No coughing, shortness of breath or wheezing  Cardiovascular:  No chest pain, no palpitations  GI:  Abdomen soft nontender, denies nausea  Endocrine:  No muscle weakness, no frequent urination, no excessive thirst  Urinary:  No dysuria, no incontinence  Musculoskeletal: see HPI and PE  SKIN:  No skin rash, no dry skin  Neurological:  No headaches, no confusion  Psychiatric:  No suicide thoughts, no anxiety, no depression  Review of all other systems is negative    Objective   Ht 5' 4\" (1.626 m)   Wt 93.4 kg (205 lb 12.8 oz)   BMI 35.33 kg/m²      General: well developed and well nourished, alert, oriented times 3, and appears comfortable  Psychiatric: Normal  HEENT: Trachea Midline, No torticollis  Cardiovascular: No discernable arrhythmia  Pulmonary: No wheezing or stridor  Abdomen: No rebound or guarding  Extremities: No peripheral edema  Skin: No masses, erythema, lacerations, fluctation, ulcerations  Neurovascular: Sensation Intact to the Median, Ulnar, Radial Nerve, Motor Intact to the Median, Ulnar, Radial Nerve, and Pulses Intact    Musculoskeletal exam:  Bilateral wrist  No erythema edema or ecchymosis noted, skin is warm to touch  Tenderness to palpation over the prestyloid recess and ulnar side of the wrist at the UT ligament  She has full wrist range of motion with flexion and extension compared to the contralateral side  Sensation is intact to light touch  Capillary refill less than 2 seconds      STUDIES REVIEWED:  No Studies to review      PROCEDURES PERFORMED:  Procedures  No Procedures performed today      "

## 2025-02-26 NOTE — LETTER
February 26, 2025     Patient: Che Ascencio  YOB: 1965  Date of Visit: 2/26/2025      To Whom it May Concern:    Che Ascencio is under my professional care. Che was seen in my office on 2/26/2025. Che can return to work without restrictions. She will follow up in 6-8 weeks for re-evaluation.     If you have any questions or concerns, please don't hesitate to call.         Sincerely,          Justice South PA-C        CC: No Recipients

## 2025-03-03 ENCOUNTER — OFFICE VISIT (OUTPATIENT)
Age: 60
End: 2025-03-03
Payer: COMMERCIAL

## 2025-03-03 VITALS — HEIGHT: 64 IN | BODY MASS INDEX: 33.29 KG/M2 | WEIGHT: 195 LBS

## 2025-03-03 DIAGNOSIS — D23.9 DERMATOFIBROMA: ICD-10-CM

## 2025-03-03 DIAGNOSIS — L82.1 SEBORRHEIC KERATOSIS: ICD-10-CM

## 2025-03-03 DIAGNOSIS — D18.01 CHERRY ANGIOMA: ICD-10-CM

## 2025-03-03 DIAGNOSIS — D36.9 ANGIOFIBROMA: ICD-10-CM

## 2025-03-03 DIAGNOSIS — L81.4 LENTIGO: ICD-10-CM

## 2025-03-03 DIAGNOSIS — D22.9 MULTIPLE MELANOCYTIC NEVI: Primary | ICD-10-CM

## 2025-03-03 PROCEDURE — 99203 OFFICE O/P NEW LOW 30 MIN: CPT | Performed by: DERMATOLOGY

## 2025-03-03 NOTE — PROGRESS NOTES
"[facility] Note     Patient Name: Che Ascencio  Encounter Date: 3/3/2025     Have you been cared for by a [facility] Dermatologist in the last 3 years and, if so, which description applies to you?    NO.   I am considered a \"new\" patient and must complete all patient intake questions. I am FEMALE/of child-bearing potential.    REVIEW OF SYSTEMS:  Have you recently had or currently have any of the following? Recent fever or chills? No  Any non-healing wound? No  Are you pregnant or planning to become pregnant? No  Are you currently or planning to be nursing or breast feeding? No   PAST MEDICAL HISTORY:  Have you personally ever had or currently have any of the following?  If \"YES,\" then please provide more detail. Skin cancer (such as Melanoma, Basal Cell Carcinoma, Squamous Cell Carcinoma?  No  Tuberculosis, HIV/AIDS, Hepatitis B or C: No  Radiation Treatment No   HISTORY OF IMMUNOSUPPRESSION:   Do you have a history of any of the following:  Systemic Immunosuppression such as Diabetes, Biologic or Immunotherapy, Chemotherapy, Organ Transplantation, Bone Marrow Transplantation or Prednsione?  No    Answering \"YES\" requires the addition of the dotphrase \"IMMUNOSUPPRESSED\" as the first diagnosis of the patient's visit.   FAMILY HISTORY:  Any \"first degree relatives\" (parent, brother, sister, or child) with the following?    Skin Cancer, Pancreatic or Other Cancer? No   PATIENT EXPERIENCE:    Do you want the Dermatologist to perform a COMPLETE skin exam today including a clinical examination under the \"bra and underwear\" areas?  Yes  If necessary, do we have your permission to call and leave a detailed message on your Preferred Phone number that includes your specific medical information?  Yes      Allergies   Allergen Reactions   • Alcohol - Food Allergy Vomiting     Ingested and topical-N/V-migraines-rash-has varying degrees of reactions      Current Outpatient Medications:   •  cloNIDine " (CATAPRES-TTS-2) 0.2 mg/24 hr, if needed  , Disp: , Rfl:   •  glucose blood (Contour Test) test strip, Use as instructed, Disp: 100 strip, Rfl: 1  •  lidocaine (XYLOCAINE) 5 % ointment, , Disp: , Rfl: 1  •  meloxicam (MOBIC) 15 mg tablet, daily as needed, Disp: , Rfl:   •  Microlet Lancets MISC, Use daily, Disp: 100 each, Rfl: 1  •  Multiple Vitamins-Minerals (multivitamin with minerals) tablet, Take 1 tablet by mouth daily, Disp: , Rfl:           Whom besides the patient is providing clinical information about today's encounter?   NO ADDITIONAL HISTORIAN (patient alone provided history)    Physical Exam and Assessment/Plan by Diagnosis:      ANGIOFIBROMA     Physical Exam:  Anatomic Location Affected:  Tip of nose  Morphological Description:  pink 1 mm papule  Pertinent Positives:  Pertinent Negatives:    Additional History of Present Condition:  present on exam. Patient reports that she has a rouge spot on the nose that she scratches and grows back     Assessment and Plan:  Based on a thorough discussion of this condition and the management approach to it (including a comprehensive discussion of the known risks, side effects and potential benefits of treatment), the patient (family) agrees to implement the following specific plan:  Reassured benign   Discusses laser treatment for removal     DERMATOFIBROMA    Physical Exam:  Anatomic Location Affected:  left potior shoulder  Morphological Description:  8mm reddish brown dermal macule  Pertinent Positives:  Pertinent Negatives:    Additional History of Present Condition:  present on exam     Assessment and Plan:  Based on a thorough discussion of this condition and the management approach to it (including a comprehensive discussion of the known risks, side effects and potential benefits of treatment), the patient (family) agrees to implement the following specific plan:  Reassured benign    Assessment and Plan:  A dermatofibroma is a common benign fibrous nodule  "that most often arises on the skin of the lower legs.  A dermatofibroma is also called a \"cutaneous fibrous histiocytoma.\"  Dermatofibromas occur at all ages and in people of every ethnicity. They are more common in women than in men.    It is not clear if dermatofibroma is a reactive process or if it is a neoplasm. The lesions are made up of proliferating fibroblasts. Histiocytes may also be involved.  They are sometimes attributed to an insect bite or ingrownhair or local trauma, but not consistently. They may be more numerous in patients with altered immunity.    Dermatofibromas most often occur on the legs and arms, but may also arise on the trunk or any site of the body.  Typical clinical features include the following:  People may have 1 or up to 15 lesions.  Size varies from 0.5-1.5 cm diameter; most lesions are 7-10 mm diameter.  They are firm nodules tethered to the skin surface and mobile over subcutaneous tissue.  The skin \"dimples\" on pinching the lesion.  Color may be pink to light brown in white skin, and dark brown to black in dark skin; some appear paler in the center.  They do not usually cause symptoms, but they are sometimes painful or itchy.  Because they are often raised lesions, they may be traumatized, for example by a razor.  Occasionally dozens may erupt within a few months, usually in the setting of immunosuppression (for example autoimmune disease, cancer or certain medications).  Dermatofibroma does not give rise to cancer. However, occasionally, it may be mistaken for dermatofibrosarcoma or desmoplastic melanoma.    A dermatofibroma is harmless and seldom causes any symptoms. Usually, only reassurance is needed. If it is nuisance or causing concern, the lesion can be removed surgically, resulting in a scar that is, by definition, usually longer in diameter than the widest portion of the dermatofibroma.  Cryotherapy, shave biopsy and laser surgery are rarely completely successful.  Skin " "punch biopsy or incisional biopsy may be undertaken if there is an atypical feature such as recent enlargement, ulceration, or asymmetrical structures and colours on dermatoscopy.    MULTIPLE MELANOCYTIC NEVI (\"Moles\")    Physical Exam:  Anatomic Location Affected:   Mostly on sun-exposed areas of the trunk and extremities  Morphological Description:  Scattered, 1-4mm round to ovoid, symmetrical-appearing, even bordered, skin colored to dark brown macules/papules, mostly in sun-exposed areas  Pertinent Positives:  Pertinent Negatives:    Additional History of Present Condition:  present on exam     Assessment and Plan:  Based on a thorough discussion of this condition and the management approach to it (including a comprehensive discussion of the known risks, side effects and potential benefits of treatment), the patient (family) agrees to implement the following specific plan:  When outside we recommend using a wide brim hat, sunglasses, long sleeve and pants, sunscreen with SPF 30+ with reapplication every 2 hours, or SPF specific clothing   Benign, reassured  Annual skin check     Melanocytic Nevi  Melanocytic nevi (\"moles\") are tan or brown, raised or flat areas of the skin which have an increased number of melanocytes. Melanocytes are the cells in our body which make pigment and account for skin color.    Some moles are present at birth (I.e., \"congenital nevi\"), while others come up later in life (i.e., \"acquired nevi\").  The sun can stimulate the body to make more moles.  Sunburns are not the only thing that triggers more moles.  Chronic sun exposure can do it too.     Clinically distinguishing a healthy mole from melanoma may be difficult, even for experienced dermatologists. The \"ABCDE's\" of moles have been suggested as a means of helping to alert a person to a suspicious mole and the possible increased risk of melanoma.  The suggestions for raising alert are as follows:    Asymmetry: Healthy moles tend to " "be symmetric, while melanomas are often asymmetric.  Asymmetry means if you draw a line through the mole, the two halves do not match in color, size, shape, or surface texture. Asymmetry can be a result of rapid enlargement of a mole, the development of a raised area on a previously flat lesion, scaling, ulceration, bleeding or scabbing within the mole.  Any mole that starts to demonstrate \"asymmetry\" should be examined promptly by a board certified dermatologist.     Border: Healthy moles tend to have discrete, even borders.  The border of a melanoma often blends into the normal skin and does not sharply delineate the mole from normal skin.  Any mole that starts to demonstrate \"uneven borders\" should be examined promptly by a board certified dermatologist.     Color: Healthy moles tend to be one color throughout.  Melanomas tend to be made up of different colors ranging from dark black, blue, white, or red.  Any mole that demonstrates a color change should be examined promptly by a board certified dermatologist.     Diameter: Healthy moles tend to be smaller than 0.6 cm in size; an exception are \"congenital nevi\" that can be larger.  Melanomas tend to grow and can often be greater than 0.6 cm (1/4 of an inch, or the size of a pencil eraser). This is only a guideline, and many normal moles may be larger than 0.6 cm without being unhealthy.  Any mole that starts to change in size (small to bigger or bigger to smaller) should be examined promptly by a board certified dermatologist.     Evolving: Healthy moles tend to \"stay the same.\"  Melanomas may often show signs of change or evolution such as a change in size, shape, color, or elevation.  Any mole that starts to itch, bleed, crust, burn, hurt, or ulcerate or demonstrate a change or evolution should be examined promptly by a board certified dermatologist.        LENTIGO    Physical Exam:  Anatomic Location Affected:  trunk, arms  Morphological Description:  Light " brown macules  Pertinent Positives:  Pertinent Negatives:    Additional History of Present Condition:  present on exam     Assessment and Plan:  Based on a thorough discussion of this condition and the management approach to it (including a comprehensive discussion of the known risks, side effects and potential benefits of treatment), the patient (family) agrees to implement the following specific plan:  When outside we recommend using a wide brim hat, sunglasses, long sleeve and pants, sunscreen with SPF 30+ with reapplication every 2 hours, or SPF specific clothing       What is a lentigo?  A lentigo is a pigmented flat or slightly raised lesion with a clearly defined edge. Unlike an ephelis (freckle), it does not fade in the winter months. There are several kinds of lentigo.  The name lentigo originally referred to its appearance resembling a small lentil. The plural of lentigo is lentigines, although “lentigos” is also in common use.    Who gets lentigines?  Lentigines can affect males and females of all ages and races. Solar lentigines are especially prevalent in fair skinned adults. Lentigines associated with syndromes are present at birth or arise during childhood.    What causes lentigines?  Common forms of lentigo are due to exposure to ultraviolet radiation:  Sun damage including sunburn   Indoor tanning   Phototherapy, especially photochemotherapy (PUVA)    Ionizing radiation, eg radiation therapy, can also cause lentigines.  Several familial syndromes associated with widespread lentigines originate from mutations in Jose Miguel-MAP kinase, mTOR signaling and PTEN pathways.    What is the treatment for lentigines?  Most lentigines are left alone. Attempts to lighten them may not be successful. The following approaches are used:  SPF 50+ broad-spectrum sunscreen   Hydroquinone bleaching cream   Alpha hydroxy acids   Vitamin C   Retinoids   Azelaic acid   Chemical peels  Individual lesions can be permanently  "removed using:  Cryotherapy   Intense pulsed light   Pigment lasers    How can lentigines be prevented?  Lentigines associated with exposure ultraviolet radiation can be prevented by very careful sun protection. Clothing is more successful at preventing new lentigines than are sunscreens.    What is the outlook for lentigines?  Lentigines usually persist. They may increase in number with age and sun exposure. Some in sun-protected sites may fade and disappear.    HINTON ANGIOMAS    Physical Exam:  Anatomic Location Affected:  trunk  Morphological Description:  Scattered cherry red, 1-4 mm papules.  Pertinent Positives:  Pertinent Negatives:    Additional History of Present Condition:  present on exam     Assessment and Plan:  Based on a thorough discussion of this condition and the management approach to it (including a comprehensive discussion of the known risks, side effects and potential benefits of treatment), the patient (family) agrees to implement the following specific plan:  Monitor for changes  Benign, reassured      Assessment and Plan:    Cherry angioma, also known as Bonilla de Nato spots, are benign vascular skin lesions. A \"cherry angioma\" is a firm red, blue or purple papule, 0.1-1 cm in diameter. When thrombosed, they can appear black in colour until evaluated with a dermatoscope when the red or purple colour is more easily seen. Cherry angioma may develop on any part of the body but most often appear on the scalp, face, lips and trunk.  An angioma is due to proliferating endothelial cells; these are the cells that line the inside of a blood vessel.    Angiomas can arise in early life or later in life; the most common type of angioma is a cherry angioma.  Cherry angiomas are very common in males and females of any age or race. They are more noticeable in white skin than in skin of colour. They markedly increase in number from about the age of 40. There may be a family history of similar lesions. " "Eruptive cherry angiomas have been rarely reported to be associated with internal malignancy. The cause of angiomas is unknown. Genetic analysis of cherry angiomas has shown that they frequently carry specific somatic missense mutations in the GNAQ and GNA11 (Q209H) genes, which are involved in other vascular and melanocytic proliferations.      SEBORRHEIC KERATOSIS; NON-INFLAMED    Physical Exam:  Anatomic Location Affected:  trunk  Morphological Description:  Flat and raised, waxy, smooth to warty textured, yellow to brownish-grey to dark brown to blackish, discrete, \"stuck-on\" appearing papules.  Pertinent Positives:  Pertinent Negatives:    Additional History of Present Condition:  present on exam     Assessment and Plan:  Based on a thorough discussion of this condition and the management approach to it (including a comprehensive discussion of the known risks, side effects and potential benefits of treatment), the patient (family) agrees to implement the following specific plan:  Monitor for changes  Benign, reassured      Seborrheic Keratosis  A seborrheic keratosis is a harmless warty spot that appears during adult life as a common sign of skin aging.  Seborrheic keratoses can arise on any area of skin, covered or uncovered, with the usual exception of the palms and soles. They do not arise from mucous membranes. Seborrheic keratoses can have highly variable appearance.      Seborrheic keratoses are extremely common. It has been estimated that over 90% of adults over the age of 60 years have one or more of them. They occur in males and females of all races, typically beginning to erupt in the 30s or 40s. They are uncommon under the age of 20 years.  The precise cause of seborrhoeic keratoses is not known.  Seborrhoeic keratoses are considered degenerative in nature. As time goes by, seborrheic keratoses tend to become more numerous. Some people inherit a tendency to develop a very large number of them; some " "people may have hundreds of them.      There is no easy way to remove multiple lesions on a single occasion.  Unless a specific lesion is \"inflamed\" and is causing pain or stinging/burning or is bleeding, most insurance companies do not authorize treatment.        Scribe Attestation    I,:  Ashley Rausch MA am acting as a scribe while in the presence of the attending physician.:       I,:  Aidee Correa MD personally performed the services described in this documentation    as scribed in my presence.:          "

## 2025-03-04 ENCOUNTER — OFFICE VISIT (OUTPATIENT)
Dept: OCCUPATIONAL THERAPY | Age: 60
End: 2025-03-04
Payer: OTHER MISCELLANEOUS

## 2025-03-04 DIAGNOSIS — S63.592A TFCC (TRIANGULAR FIBROCARTILAGE COMPLEX) TEAR, LEFT, INITIAL ENCOUNTER: ICD-10-CM

## 2025-03-04 DIAGNOSIS — M25.832 ULNAR IMPACTION SYNDROME, LEFT: Primary | ICD-10-CM

## 2025-03-04 DIAGNOSIS — S63.592D TFCC (TRIANGULAR FIBROCARTILAGE COMPLEX) TEAR, LEFT, SUBSEQUENT ENCOUNTER: ICD-10-CM

## 2025-03-04 DIAGNOSIS — M25.831 ULNAR IMPACTION SYNDROME, RIGHT: ICD-10-CM

## 2025-03-04 DIAGNOSIS — S63.591A TFCC (TRIANGULAR FIBROCARTILAGE COMPLEX) TEAR, RIGHT, INITIAL ENCOUNTER: ICD-10-CM

## 2025-03-04 PROCEDURE — 97110 THERAPEUTIC EXERCISES: CPT

## 2025-03-04 PROCEDURE — 97140 MANUAL THERAPY 1/> REGIONS: CPT

## 2025-03-04 NOTE — PROGRESS NOTES
"Daily Note     Today's date: 3/4/2025  Patient name: Che Ascencio  : 1965  MRN: [id number]  Referring provider: Stan Bonilla MD  Dx:   Encounter Diagnosis     ICD-10-CM    1. Ulnar impaction syndrome, left  M25.832       2. Ulnar impaction syndrome, right  M25.831       3. TFCC (triangular fibrocartilage complex) tear, left, subsequent encounter  S63.592D       4. TFCC (triangular fibrocartilage complex) tear, left, initial encounter  S63.592A       5. TFCC (triangular fibrocartilage complex) tear, right, initial encounter  S63.591A             Start Time: 07  Stop Time: 0753  Total time in clinic (min): 44 minutes    Subjective: \"They said to continue 4-6 weeks.\"      Objective: See treatment diary below      Assessment: Tolerated treatment well. Pt continues to report intermittent ulnar sided pain at both wrists. Pt's tolerance for strengthening fluctuates and she often is sore after therapy exercises. Pt was instructed to continue therapy for 4-6 weeks. Patient would benefit from continued OT      Plan: Continue per plan of care.  Progress treatment as tolerated.       Precautions: B ulnar impaction syndrome with TFCC tear    Manuals  1/15 eval   3       STM/IASTM B forearm flex/ext    20'  25'  15'  10' 15' 15'           K-tape applied                                                                 Ther Ex                       Forearm and wrist AROM    x20  x20 X20    Gentle wrist stretches  X20    Gentle wrist stretches X20    Gentle wrist stretches X20    Gentle wrist stretches      Forearm and wrist isometrics  5x5s isometrics  5x5s isometrics           Static      Progress to dynamic  in various positions  Small hammer pronation/supination x20    Wrist curls x10 each direction 2lb Yellow hammer pronation/supination x20    Wrist curls x10 each direction 3lb    Green flex bar bend/twist  x30   Yellow hammer pronation/supination x20    Wrist curls x10 each " direction 2lb    Green flex bar bend/twist  x30 Yellow hammer pronation/supination x20    Wrist curls x10 each direction 2lb    Green flex bar bend/twist  x30 Yellow hammer pronation/supination x20    Wrist curls x10 each direction 2lb    Green flex bar bend/twist  x30                                                                                                                                                      Ther Activity                   Weight bearing                   Activity modification                                                                                                    Neuro Re-Ed                                                               Ortho Fit                  [place] PRN                                             Modalities                  HP PRN  did not want heat  5'  5'    5'  5'

## 2025-03-11 ENCOUNTER — OFFICE VISIT (OUTPATIENT)
Dept: OCCUPATIONAL THERAPY | Age: 60
End: 2025-03-11
Payer: OTHER MISCELLANEOUS

## 2025-03-11 DIAGNOSIS — M25.831 ULNAR IMPACTION SYNDROME, RIGHT: ICD-10-CM

## 2025-03-11 DIAGNOSIS — S63.591A TFCC (TRIANGULAR FIBROCARTILAGE COMPLEX) TEAR, RIGHT, INITIAL ENCOUNTER: ICD-10-CM

## 2025-03-11 DIAGNOSIS — S63.591D TFCC (TRIANGULAR FIBROCARTILAGE COMPLEX) TEAR, RIGHT, SUBSEQUENT ENCOUNTER: ICD-10-CM

## 2025-03-11 DIAGNOSIS — S63.592A TFCC (TRIANGULAR FIBROCARTILAGE COMPLEX) TEAR, LEFT, INITIAL ENCOUNTER: ICD-10-CM

## 2025-03-11 DIAGNOSIS — M25.832 ULNAR IMPACTION SYNDROME, LEFT: Primary | ICD-10-CM

## 2025-03-11 DIAGNOSIS — S63.592D TFCC (TRIANGULAR FIBROCARTILAGE COMPLEX) TEAR, LEFT, SUBSEQUENT ENCOUNTER: ICD-10-CM

## 2025-03-11 PROCEDURE — 97140 MANUAL THERAPY 1/> REGIONS: CPT

## 2025-03-11 PROCEDURE — 97530 THERAPEUTIC ACTIVITIES: CPT

## 2025-03-11 PROCEDURE — 97110 THERAPEUTIC EXERCISES: CPT

## 2025-03-11 NOTE — PROGRESS NOTES
"Daily Note     Today's date: 3/11/2025  Patient name: Che Ascencio  : 1965  MRN: [id number]  Referring provider: Stan Bonilla MD  Dx:   Encounter Diagnosis     ICD-10-CM    1. Ulnar impaction syndrome, left  M25.832       2. Ulnar impaction syndrome, right  M25.831       3. TFCC (triangular fibrocartilage complex) tear, left, subsequent encounter  S63.592D       4. TFCC (triangular fibrocartilage complex) tear, left, initial encounter  S63.592A       5. TFCC (triangular fibrocartilage complex) tear, right, initial encounter  S63.591A       6. TFCC (triangular fibrocartilage complex) tear, right, subsequent encounter  S63.591D             Start Time: 705  Stop Time: 748  Total time in clinic (min): 43 minutes    Subjective: \"It's roughly the same. I did some partial push ups and it was a little sore.\"      Objective: See treatment diary below      Assessment: Tolerated treatment well. Pt started partial weight bearing with wrists extended. Performed a few sets of partial pushups. Denied pain during exercise however noticed some soreness the day following. She continues to report intermittent pain throughout daily activities. Patient would benefit from continued OT      Plan: Continue per plan of care.  Progress treatment as tolerated.       Precautions: B ulnar impaction syndrome with TFCC tear    Manuals  1/15 eval    2/ 3/4 3/11      STM/IASTM B forearm flex/ext    20'  25'  15'  10' 15' 15' 15'          K-tape applied                                                                 Ther Ex                       Forearm and wrist AROM    x20  x20 X20    Gentle wrist stretches  X20    Gentle wrist stretches X20    Gentle wrist stretches X20    Gentle wrist stretches X20    Gentle wrist stretches     Forearm and wrist isometrics  5x5s isometrics  5x5s isometrics           Static      Progress to dynamic  in various positions  Small hammer pronation/supination " x20    Wrist curls x10 each direction 2lb Yellow hammer pronation/supination x20    Wrist curls x10 each direction 3lb    Green flex bar bend/twist  x30   Yellow hammer pronation/supination x20    Wrist curls x10 each direction 2lb    Green flex bar bend/twist  x30 Yellow hammer pronation/supination x20    Wrist curls x10 each direction 2lb    Green flex bar bend/twist  x30 Yellow hammer pronation/supination x20    Wrist curls x10 each direction 2lb    Green flex bar bend/twist  x30 Yellow hammer pronation/supination x20    Wrist curls x10 each direction 2lb    Green flex bar bend/twist  x30                                                                                                                                                     Ther Activity                   Weight bearing              Pink putty press with 2lb dumbbell.      Activity modification                                                                                                    Neuro Re-Ed                                                               Ortho Fit                  [place] PRN                                             Modalities                  HP PRN  did not want heat  5'  5'    5'  5' 5'

## 2025-03-18 ENCOUNTER — OFFICE VISIT (OUTPATIENT)
Dept: OCCUPATIONAL THERAPY | Age: 60
End: 2025-03-18
Payer: OTHER MISCELLANEOUS

## 2025-03-18 DIAGNOSIS — S63.592A TFCC (TRIANGULAR FIBROCARTILAGE COMPLEX) TEAR, LEFT, INITIAL ENCOUNTER: ICD-10-CM

## 2025-03-18 DIAGNOSIS — M25.832 ULNAR IMPACTION SYNDROME, LEFT: Primary | ICD-10-CM

## 2025-03-18 DIAGNOSIS — S63.591D TFCC (TRIANGULAR FIBROCARTILAGE COMPLEX) TEAR, RIGHT, SUBSEQUENT ENCOUNTER: ICD-10-CM

## 2025-03-18 DIAGNOSIS — S63.591A TFCC (TRIANGULAR FIBROCARTILAGE COMPLEX) TEAR, RIGHT, INITIAL ENCOUNTER: ICD-10-CM

## 2025-03-18 DIAGNOSIS — M25.831 ULNAR IMPACTION SYNDROME, RIGHT: ICD-10-CM

## 2025-03-18 DIAGNOSIS — S63.592D TFCC (TRIANGULAR FIBROCARTILAGE COMPLEX) TEAR, LEFT, SUBSEQUENT ENCOUNTER: ICD-10-CM

## 2025-03-18 PROCEDURE — 97140 MANUAL THERAPY 1/> REGIONS: CPT

## 2025-03-18 PROCEDURE — 97110 THERAPEUTIC EXERCISES: CPT

## 2025-03-18 NOTE — PROGRESS NOTES
"Daily Note     Today's date: 3/18/2025  Patient name: Che Ascencio  : 1965  MRN: [id number]  Referring provider: Stan Bonilla MD  Dx:   Encounter Diagnosis     ICD-10-CM    1. Ulnar impaction syndrome, left  M25.832       2. Ulnar impaction syndrome, right  M25.831       3. TFCC (triangular fibrocartilage complex) tear, left, subsequent encounter  S63.592D       4. TFCC (triangular fibrocartilage complex) tear, left, initial encounter  S63.592A       5. TFCC (triangular fibrocartilage complex) tear, right, initial encounter  S63.591A       6. TFCC (triangular fibrocartilage complex) tear, right, subsequent encounter  S63.591D             Start Time: 705  Stop Time: 745  Total time in clinic (min): 40 minutes    Subjective: \"It's more sore today and tight. I did more gripping stuff at work.\"      Objective: See treatment diary below      Assessment: Tolerated treatment well. Pt continues with intermittent bilateral wrist pain despite AROM, stretching, strengthening, activity modification. She tolerates exercises well. Requires cues for proper stretching and strengthening technique. Patient would benefit from continued OT      Plan: Continue per plan of care.  Progress treatment as tolerated.       Precautions: B ulnar impaction syndrome with TFCC tear    Manuals  1/15 eval    2/5  2/19 2/25 3/4 3/11 3/18     STM/IASTM B forearm flex/ext    20'  25'  15'  10' 15' 15' 15' 15'         K-tape applied                                                                 Ther Ex                       Forearm and wrist AROM    x20  x20 X20    Gentle wrist stretches  X20    Gentle wrist stretches X20    Gentle wrist stretches X20    Gentle wrist stretches X20    Gentle wrist stretches X20    Gentle wrist stretches    Forearm and wrist isometrics  5x5s isometrics  5x5s isometrics           Static      Progress to dynamic  in various positions  Small hammer pronation/supination x20    Wrist curls " x10 each direction 2lb Yellow hammer pronation/supination x20    Wrist curls x10 each direction 3lb    Green flex bar bend/twist  x30   Yellow hammer pronation/supination x20    Wrist curls x10 each direction 2lb    Green flex bar bend/twist  x30 Yellow hammer pronation/supination x20    Wrist curls x10 each direction 2lb    Green flex bar bend/twist  x30 Yellow hammer pronation/supination x20    Wrist curls x10 each direction 2lb    Green flex bar bend/twist  x30 Yellow hammer pronation/supination x20    Wrist curls x10 each direction 2lb    Green flex bar bend/twist  x30 Yellow hammer pronation/supination x20    Wrist curls 2x10 each direction 2-3lb    Green flex bar bend/twist  X30    Red Med ball press and roll into black digit web                                                                                                                                                    Ther Activity                   Weight bearing              Pink putty press with 2lb dumbbell.      Activity modification                                                                                                    Neuro Re-Ed                                                               Ortho Fit                  [place] PRN                                             Modalities                  HP PRN  did not want heat  5'  5'    5'  5' 5' 5'

## 2025-03-25 ENCOUNTER — OFFICE VISIT (OUTPATIENT)
Dept: OCCUPATIONAL THERAPY | Age: 60
End: 2025-03-25
Payer: OTHER MISCELLANEOUS

## 2025-03-25 DIAGNOSIS — S63.591D TFCC (TRIANGULAR FIBROCARTILAGE COMPLEX) TEAR, RIGHT, SUBSEQUENT ENCOUNTER: ICD-10-CM

## 2025-03-25 DIAGNOSIS — M25.832 ULNAR IMPACTION SYNDROME, LEFT: Primary | ICD-10-CM

## 2025-03-25 DIAGNOSIS — S63.592D TFCC (TRIANGULAR FIBROCARTILAGE COMPLEX) TEAR, LEFT, SUBSEQUENT ENCOUNTER: ICD-10-CM

## 2025-03-25 DIAGNOSIS — S63.591A TFCC (TRIANGULAR FIBROCARTILAGE COMPLEX) TEAR, RIGHT, INITIAL ENCOUNTER: ICD-10-CM

## 2025-03-25 DIAGNOSIS — M25.831 ULNAR IMPACTION SYNDROME, RIGHT: ICD-10-CM

## 2025-03-25 PROCEDURE — 97140 MANUAL THERAPY 1/> REGIONS: CPT

## 2025-03-25 PROCEDURE — 97110 THERAPEUTIC EXERCISES: CPT

## 2025-03-25 NOTE — PROGRESS NOTES
"Daily Note     Today's date: 3/25/2025  Patient name: Che Ascencio  : 1965  MRN: [id number]  Referring provider: Stan Bonilla MD  Dx:   Encounter Diagnosis     ICD-10-CM    1. Ulnar impaction syndrome, left  M25.832       2. TFCC (triangular fibrocartilage complex) tear, right, initial encounter  S63.591A       3. Ulnar impaction syndrome, right  M25.831       4. TFCC (triangular fibrocartilage complex) tear, left, subsequent encounter  S63.592D       5. TFCC (triangular fibrocartilage complex) tear, right, subsequent encounter  S63.591D           Start Time: 0705  Stop Time: 0750  Total time in clinic (min): 45 minutes    Subjective: \"Still having off and on pain. In some ways it's better and some ways it's the same.\"      Objective: See treatment diary below      Assessment: Tolerated treatment well. Pt continues to report inconsistent pain throughout normal routine. She feels that there has been some improvement since starting therapy but in general is about the same. She tolerated all exercises well. Trialed wrist distractions today for pain management. Patient would benefit from continued OT      Plan: Continue per plan of care.  Progress treatment as tolerated.       Precautions: B ulnar impaction syndrome with TFCC tear    Manuals  1/15 eval 3/18 3/25 visit #10    STM/IASTM B forearm flex/ext   15' 20'                                  Ther Ex            Forearm and wrist AROM   X20    Gentle wrist stretches X20    Gentle wrist stretches   Forearm and wrist isometrics      Static      Progress to dynamic  in various positions  Yellow hammer pronation/supination x20    Wrist curls 2x10 each direction 2-3lb    Green flex bar bend/twist  X30    Red Med ball press and roll into black digit web Yellow hammer pronation/supination x20    Wrist curls 2x10 each direction 2-3lb    Green flex bar bend/twist  X30    Red Med ball press and roll into black digit web                                    "                                Ther Activity        Weight bearing        Activity modification                                       Neuro Re-Ed                           Ortho Fit       [place] PRN                    Modalities       HP PRN  did not want heat 5'

## 2025-04-01 ENCOUNTER — OFFICE VISIT (OUTPATIENT)
Dept: OCCUPATIONAL THERAPY | Age: 60
End: 2025-04-01
Payer: OTHER MISCELLANEOUS

## 2025-04-01 DIAGNOSIS — M25.831 ULNAR IMPACTION SYNDROME, RIGHT: ICD-10-CM

## 2025-04-01 DIAGNOSIS — S63.592D TFCC (TRIANGULAR FIBROCARTILAGE COMPLEX) TEAR, LEFT, SUBSEQUENT ENCOUNTER: ICD-10-CM

## 2025-04-01 DIAGNOSIS — S63.591A TFCC (TRIANGULAR FIBROCARTILAGE COMPLEX) TEAR, RIGHT, INITIAL ENCOUNTER: ICD-10-CM

## 2025-04-01 DIAGNOSIS — S63.591D TFCC (TRIANGULAR FIBROCARTILAGE COMPLEX) TEAR, RIGHT, SUBSEQUENT ENCOUNTER: ICD-10-CM

## 2025-04-01 DIAGNOSIS — S63.592A TFCC (TRIANGULAR FIBROCARTILAGE COMPLEX) TEAR, LEFT, INITIAL ENCOUNTER: ICD-10-CM

## 2025-04-01 DIAGNOSIS — M25.832 ULNAR IMPACTION SYNDROME, LEFT: Primary | ICD-10-CM

## 2025-04-01 PROCEDURE — 97110 THERAPEUTIC EXERCISES: CPT

## 2025-04-01 NOTE — PROGRESS NOTES
"Daily Note     Today's date: 2025  Patient name: Che Ascencio  : 1965  MRN: [id number]  Referring provider: Stan Bonilla MD  Dx:   Encounter Diagnosis     ICD-10-CM    1. Ulnar impaction syndrome, left  M25.832       2. TFCC (triangular fibrocartilage complex) tear, right, initial encounter  S63.591A       3. Ulnar impaction syndrome, right  M25.831       4. TFCC (triangular fibrocartilage complex) tear, left, subsequent encounter  S63.592D       5. TFCC (triangular fibrocartilage complex) tear, right, subsequent encounter  S63.591D       6. TFCC (triangular fibrocartilage complex) tear, left, initial encounter  S63.592A           Start Time: 07  Stop Time: 0740  Total time in clinic (min): 38 minutes    Subjective: \"It's been pretty sore this week.\"      Objective: See treatment diary below      Assessment: Tolerated treatment well. Pt reports she remains with pain/soreness throughout daily activities, however pain is still inconsistent. Pt does note that pain has been worse in left wrist. Pt was unable to tolerate some strengthening today due to pain. Patient would benefit from continued OT      Plan: Continue per plan of care.  Progress treatment as tolerated.       Precautions: B ulnar impaction syndrome with TFCC tear    Manuals  1/15 eval 3/18 3/25 visit #10     STM/IASTM B forearm flex/ext   15' 20'  20'                                     Ther Ex             Forearm and wrist AROM   X20    Gentle wrist stretches X20    Gentle wrist stretches X20    Gentle wrist stretches   Forearm and wrist isometrics    x20   Static      Progress to dynamic  in various positions  Yellow hammer pronation/supination x20    Wrist curls 2x10 each direction 2-3lb    Green flex bar bend/twist  X30    Red Med ball press and roll into black digit web Yellow hammer pronation/supination x20    Wrist curls 2x10 each direction 2-3lb    Green flex bar bend/twist  X30    Red Med ball press and roll into " black digit web Yellow hammer pronation/supination x20    Wrist curls 2x10 each direction 2-3lb    Green flex bar bend/twist  X30    Red Med ball press and roll into black digit web                                                                             Ther Activity         Weight bearing         Activity modification                                             Neuro Re-Ed                               Ortho Fit        [place] PRN                       Modalities        HP PRN  did not want heat 5'

## 2025-04-07 ENCOUNTER — OFFICE VISIT (OUTPATIENT)
Dept: FAMILY MEDICINE CLINIC | Facility: CLINIC | Age: 60
End: 2025-04-07
Payer: COMMERCIAL

## 2025-04-07 VITALS
WEIGHT: 202 LBS | HEART RATE: 78 BPM | BODY MASS INDEX: 34.49 KG/M2 | DIASTOLIC BLOOD PRESSURE: 82 MMHG | OXYGEN SATURATION: 98 % | RESPIRATION RATE: 16 BRPM | TEMPERATURE: 97 F | HEIGHT: 64 IN | SYSTOLIC BLOOD PRESSURE: 118 MMHG

## 2025-04-07 DIAGNOSIS — Z78.0 ENCOUNTER FOR OSTEOPOROSIS SCREENING IN ASYMPTOMATIC POSTMENOPAUSAL PATIENT: ICD-10-CM

## 2025-04-07 DIAGNOSIS — E55.9 VITAMIN D DEFICIENCY: ICD-10-CM

## 2025-04-07 DIAGNOSIS — Z00.00 ANNUAL PHYSICAL EXAM: Primary | ICD-10-CM

## 2025-04-07 DIAGNOSIS — Z13.820 ENCOUNTER FOR OSTEOPOROSIS SCREENING IN ASYMPTOMATIC POSTMENOPAUSAL PATIENT: ICD-10-CM

## 2025-04-07 DIAGNOSIS — R73.03 PREDIABETES: ICD-10-CM

## 2025-04-07 DIAGNOSIS — Z13.220 SCREENING, LIPID: ICD-10-CM

## 2025-04-07 DIAGNOSIS — Z12.31 ENCOUNTER FOR SCREENING MAMMOGRAM FOR BREAST CANCER: ICD-10-CM

## 2025-04-07 PROCEDURE — 99396 PREV VISIT EST AGE 40-64: CPT | Performed by: FAMILY MEDICINE

## 2025-04-07 NOTE — PATIENT INSTRUCTIONS
"Patient Education     Routine physical for adults   The Basics   Written by the doctors and editors at [facility]   What is a physical? -- A physical is a routine visit, or \"check-up,\" with your doctor. You might also hear it called a \"wellness visit\" or \"preventive visit.\"  During each visit, the doctor will:   Ask about your physical and mental health   Ask about your habits, behaviors, and lifestyle   Do an exam   Give you vaccines if needed   Talk to you about any medicines you take   Give advice about your health   Answer your questions  Getting regular check-ups is an important part of taking care of your health. It can help your doctor find and treat any problems you have. But it's also important for preventing health problems.  A routine physical is different from a \"sick visit.\" A sick visit is when you see a doctor because of a health concern or problem. Since physicals are scheduled ahead of time, you can think about what you want to ask the doctor.  How often should I get a physical? -- It depends on your age and health. In general, for people age 21 years and older:   If you are younger than 50 years, you might be able to get a physical every 3 years.   If you are 50 years or older, your doctor might recommend a physical every year.  If you have an ongoing health condition, like diabetes or high blood pressure, your doctor will probably want to see you more often.  What happens during a physical? -- In general, each visit will include:   Physical exam - The doctor or nurse will check your height, weight, heart rate, and blood pressure. They will also look at your eyes and ears. They will ask about how you are feeling and whether you have any symptoms that bother you.   Medicines - It's a good idea to bring a list of all the medicines you take to each doctor visit. Your doctor will talk to you about your medicines and answer any questions. Tell them if you are having any side effects that bother you. You " "should also tell them if you are having trouble paying for any of your medicines.   Habits and behaviors - This includes:   Your diet   Your exercise habits   Whether you smoke, drink alcohol, or use drugs   Whether you are sexually active   Whether you feel safe at home  Your doctor will talk to you about things you can do to improve your health and lower your risk of health problems. They will also offer help and support. For example, if you want to quit smoking, they can give you advice and might prescribe medicines. If you want to improve your diet or get more physical activity, they can help you with this, too.   Lab tests, if needed - The tests you get will depend on your age and situation. For example, your doctor might want to check your:   Cholesterol   Blood sugar   Iron level   Vaccines - The recommended vaccines will depend on your age, health, and what vaccines you already had. Vaccines are very important because they can prevent certain serious or deadly infections.   Discussion of screening - \"Screening\" means checking for diseases or other health problems before they cause symptoms. Your doctor can recommend screening based on your age, risk, and preferences. This might include tests to check for:   Cancer, such as breast, prostate, cervical, ovarian, colorectal, prostate, lung, or skin cancer   Sexually transmitted infections, such as chlamydia and gonorrhea   Mental health conditions like depression and anxiety  Your doctor will talk to you about the different types of screening tests. They can help you decide which screenings to have. They can also explain what the results might mean.   Answering questions - The physical is a good time to ask the doctor or nurse questions about your health. If needed, they can refer you to other doctors or specialists, too.  Adults older than 65 years often need other care, too. As you get older, your doctor will talk to you about:   How to prevent falling at " home   Hearing or vision tests   Memory testing   How to take your medicines safely   Making sure that you have the help and support you need at home  All topics are updated as new evidence becomes available and our peer review process is complete.  This topic retrieved from [employer] on: May 02, 2024.  Topic 395519 Version 1.0  Release: 32.4.3 - C32.122  © 2024 UpToDate, Inc. and/or its affiliates. All rights reserved.  Consumer Information Use and Disclaimer   Disclaimer: This generalized information is a limited summary of diagnosis, treatment, and/or medication information. It is not meant to be comprehensive and should be used as a tool to help the user understand and/or assess potential diagnostic and treatment options. It does NOT include all information about conditions, treatments, medications, side effects, or risks that may apply to a specific patient. It is not intended to be medical advice or a substitute for the medical advice, diagnosis, or treatment of a health care provider based on the health care provider's examination and assessment of a patient's specific and unique circumstances. Patients must speak with a health care provider for complete information about their health, medical questions, and treatment options, including any risks or benefits regarding use of medications. This information does not endorse any treatments or medications as safe, effective, or approved for treating a specific patient. UpToDate, Inc. and its affiliates disclaim any warranty or liability relating to this information or the use thereof.The use of this information is governed by the Terms of Use, available at https://www.[employer]su[employer].com/en/know/clinical-effectiveness-terms. 2024© UpToDate, Inc. and its affiliates and/or licensors. All rights reserved.  Copyright   © 2024 UpToDate, Inc. and/or its affiliates. All rights reserved.

## 2025-04-07 NOTE — PROGRESS NOTES
Adult Annual Physical  Name: Che Ascencio      : 1965      MRN: [id number]  Encounter Provider: Nahomi Jones DO  Encounter Date: 2025   Encounter department: [facility]    :  Assessment & Plan  Annual physical exam  Tdap in   Flu shot at work  Would like to hold off on shingrix       Prediabetes    Orders:  •  Comprehensive metabolic panel; Future  •  Hemoglobin A1C; Future    Vitamin D deficiency    Orders:  •  TSH, 3rd generation with Free T4 reflex; Future  •  Vitamin D 25 hydroxy; Future    Screening, lipid    Orders:  •  Lipid Panel with Direct LDL reflex; Future    Encounter for screening mammogram for breast cancer    Orders:  •  Mammo screening bilateral w 3d and cad; Future    Encounter for osteoporosis screening in asymptomatic postmenopausal patient    Orders:  •  DXA bone density spine hip and pelvis; Future        Preventive Screenings:  - Diabetes Screening: screening up-to-date  - Hepatitis C screening: screening not indicated   - HIV screening: screening not indicated   - Breast cancer screening: screening up-to-date   - Colon cancer screening: screening up-to-date   - Lung cancer screening: screening not indicated     Immunizations:  - Immunizations due: Influenza, Tdap and Zoster (Shingrix)    Counseling/Anticipatory Guidance:    - Diet: discussed recommendations for a healthy/well-balanced diet.   - Exercise: the importance of regular exercise/physical activity was discussed. Recommend exercise 3-5 times per week for at least 30 minutes.       Depression Screening and Follow-up Plan: Patient was screened for depression during today's encounter. They screened negative with a PHQ-2 score of 0.          History of Present Illness     Adult Annual Physical:  Patient presents for annual physical. Here for wellness.     Diet and Physical Activity:  - Diet/Nutrition: low fat diet, low carb diet, low calorie diet, heart healthy (low sodium) diet and consuming 3-5  "servings of fruits/vegetables daily.  - Exercise: walking and moderate cardiovascular exercise.    Depression Screening:  - PHQ-2 Score: 0    General Health:  - Sleep: sleeps well and 7-8 hours of sleep on average.  - Hearing: normal hearing bilateral ears.  - Vision: wears glasses and no vision problems.  - Dental: regular dental visits, brushes teeth twice daily and floss regularly.    /GYN Health:  - Follows with GYN: yes.   - Menopause: postmenopausal.   - History of STDs: no    Advanced Care Planning:  - Has an advanced directive?: no    - Has a durable medical POA?: no      Review of Systems   Constitutional: Negative.    HENT: Negative.     Eyes: Negative.    Respiratory: Negative.     Cardiovascular: Negative.    Gastrointestinal: Negative.    Endocrine: Negative.    Genitourinary: Negative.    Musculoskeletal: Negative.    Allergic/Immunologic: Negative.    Neurological: Negative.    Hematological: Negative.    Psychiatric/Behavioral: Negative.       Medical History Reviewed by provider this encounter:  Tobacco  Allergies  Meds  Problems  Med Hx  Surg Hx  Fam Hx     .    Objective   /82 (BP Location: Left arm, Patient Position: Sitting, Cuff Size: Large)   Pulse 78   Temp (!) 97 °F (36.1 °C) (Tympanic)   Resp 16   Ht 5' 4.33\" (1.634 m)   Wt 91.6 kg (202 lb)   SpO2 98%   BMI 34.32 kg/m²     Physical Exam  Vitals and nursing note reviewed.   Constitutional:       Appearance: Normal appearance. She is well-developed.   HENT:      Head: Normocephalic and atraumatic.      Right Ear: External ear normal.      Left Ear: External ear normal.      Nose: Nose normal.   Eyes:      Conjunctiva/sclera: Conjunctivae normal.      Pupils: Pupils are equal, round, and reactive to light.   Cardiovascular:      Rate and Rhythm: Normal rate and regular rhythm.      Heart sounds: Normal heart sounds.   Pulmonary:      Effort: Pulmonary effort is normal.      Breath sounds: Normal breath sounds. "   Abdominal:      General: Bowel sounds are normal.      Palpations: Abdomen is soft.   Musculoskeletal:         General: Normal range of motion.      Cervical back: Normal range of motion and neck supple.   Skin:     General: Skin is warm and dry.      Capillary Refill: Capillary refill takes less than 2 seconds.   Neurological:      Mental Status: She is alert and oriented to person, place, and time.   Psychiatric:         Behavior: Behavior normal.         Thought Content: Thought content normal.         Judgment: Judgment normal.

## 2025-04-08 ENCOUNTER — OFFICE VISIT (OUTPATIENT)
Dept: OCCUPATIONAL THERAPY | Age: 60
End: 2025-04-08
Payer: OTHER MISCELLANEOUS

## 2025-04-08 DIAGNOSIS — S63.592D TFCC (TRIANGULAR FIBROCARTILAGE COMPLEX) TEAR, LEFT, SUBSEQUENT ENCOUNTER: ICD-10-CM

## 2025-04-08 DIAGNOSIS — S63.592A TFCC (TRIANGULAR FIBROCARTILAGE COMPLEX) TEAR, LEFT, INITIAL ENCOUNTER: ICD-10-CM

## 2025-04-08 DIAGNOSIS — M25.831 ULNAR IMPACTION SYNDROME, RIGHT: ICD-10-CM

## 2025-04-08 DIAGNOSIS — S63.591D TFCC (TRIANGULAR FIBROCARTILAGE COMPLEX) TEAR, RIGHT, SUBSEQUENT ENCOUNTER: ICD-10-CM

## 2025-04-08 DIAGNOSIS — S63.591A TFCC (TRIANGULAR FIBROCARTILAGE COMPLEX) TEAR, RIGHT, INITIAL ENCOUNTER: ICD-10-CM

## 2025-04-08 DIAGNOSIS — M25.832 ULNAR IMPACTION SYNDROME, LEFT: Primary | ICD-10-CM

## 2025-04-08 PROCEDURE — 97140 MANUAL THERAPY 1/> REGIONS: CPT

## 2025-04-08 PROCEDURE — 97110 THERAPEUTIC EXERCISES: CPT

## 2025-04-08 NOTE — PROGRESS NOTES
"Daily Note     Today's date: 2025  Patient name: Che Ascencio  : 1965  MRN: [id number]  Referring provider: Stan Bonilla MD  Dx:   Encounter Diagnosis     ICD-10-CM    1. Ulnar impaction syndrome, left  M25.832       2. TFCC (triangular fibrocartilage complex) tear, right, initial encounter  S63.591A       3. TFCC (triangular fibrocartilage complex) tear, right, subsequent encounter  S63.591D       4. TFCC (triangular fibrocartilage complex) tear, left, initial encounter  S63.592A       5. Ulnar impaction syndrome, right  M25.831       6. TFCC (triangular fibrocartilage complex) tear, left, subsequent encounter  S63.592D             Start Time: 705  Stop Time: 745  Total time in clinic (min): 40 minutes    Subjective: \"It's flared up.\"      Objective: See treatment diary below      Assessment: Tolerated treatment well. Pt reports a flare up of symptoms compared to last visit. She has been working and notices increased pain with simple work tasks such as ripping athletic tape or carrying a water . She has 1 more OT visit prior to seeing ortho. Patient would benefit from continued OT      Plan: Continue per plan of care.  Progress treatment as tolerated.       Precautions: B ulnar impaction syndrome with TFCC tear    Manuals  1/15 eval 3/18 3/25 visit #10     STM/IASTM B forearm flex/ext   15' 20'  20'                                     Ther Ex             Forearm and wrist AROM   X20    Gentle wrist stretches X20    Gentle wrist stretches X20    Gentle wrist stretches   Forearm and wrist isometrics    x20   Static      Progress to dynamic  in various positions  Yellow hammer pronation/supination x20    Wrist curls 2x10 each direction 2-3lb    Green flex bar bend/twist  X30    Red Med ball press and roll into black digit web Yellow hammer pronation/supination x20    Wrist curls 2x10 each direction 2-3lb    Green flex bar bend/twist  X30    Red Med ball press and roll " into black digit web Yellow hammer pronation/supination x20    Wrist curls 2x10 each direction 2-3lb    Green flex bar bend/twist  X30    Red Med ball press and roll into black digit web                                                                             Ther Activity         Weight bearing         Activity modification                                             Neuro Re-Ed                               Ortho Fit        [place] PRN                       Modalities        HP PRN  did not want heat 5'

## 2025-04-15 ENCOUNTER — OFFICE VISIT (OUTPATIENT)
Dept: OCCUPATIONAL THERAPY | Age: 60
End: 2025-04-15
Payer: OTHER MISCELLANEOUS

## 2025-04-15 DIAGNOSIS — S63.591D TFCC (TRIANGULAR FIBROCARTILAGE COMPLEX) TEAR, RIGHT, SUBSEQUENT ENCOUNTER: ICD-10-CM

## 2025-04-15 DIAGNOSIS — M25.832 ULNAR IMPACTION SYNDROME, LEFT: Primary | ICD-10-CM

## 2025-04-15 DIAGNOSIS — S63.592A TFCC (TRIANGULAR FIBROCARTILAGE COMPLEX) TEAR, LEFT, INITIAL ENCOUNTER: ICD-10-CM

## 2025-04-15 DIAGNOSIS — S63.591A TFCC (TRIANGULAR FIBROCARTILAGE COMPLEX) TEAR, RIGHT, INITIAL ENCOUNTER: ICD-10-CM

## 2025-04-15 DIAGNOSIS — M25.831 ULNAR IMPACTION SYNDROME, RIGHT: ICD-10-CM

## 2025-04-15 DIAGNOSIS — S63.592D TFCC (TRIANGULAR FIBROCARTILAGE COMPLEX) TEAR, LEFT, SUBSEQUENT ENCOUNTER: ICD-10-CM

## 2025-04-15 PROCEDURE — 97140 MANUAL THERAPY 1/> REGIONS: CPT

## 2025-04-15 PROCEDURE — 97168 OT RE-EVAL EST PLAN CARE: CPT

## 2025-04-15 NOTE — PROGRESS NOTES
"OT Discharge     Today's date: 4/15/2025  Patient name: Che Ascencio  : 1965  MRN: [id number]  Referring provider: Stan Bonilla MD  Dx:   Encounter Diagnosis     ICD-10-CM    1. Ulnar impaction syndrome, left  M25.832       2. TFCC (triangular fibrocartilage complex) tear, right, initial encounter  S63.591A       3. TFCC (triangular fibrocartilage complex) tear, right, subsequent encounter  S63.591D       4. TFCC (triangular fibrocartilage complex) tear, left, initial encounter  S63.592A       5. Ulnar impaction syndrome, right  M25.831       6. TFCC (triangular fibrocartilage complex) tear, left, subsequent encounter  S63.592D             Start Time: 705  Stop Time: 745  Total time in clinic (min): 40 minutes      Subjective: \"It's about the same. There are good days and bad days.\" Pt denies much improvement over course of therapy.      Objective: See treatment diary below    AROM         Wrist    Right Left   Flexion 70 (was 60) 62 (was 65)   Extension 65 (was 55) 63 (was 55)   Radial Dev. 23 (was 17) 22 (was 20)   Ulnar Dev. 25 (was 28) 32 (was 22)         /Pinch Strength  Dynamometer R UE  L UE comments   Position #2 (lbs) 65.1 neutral (was 80.7)  58.6 supinated (was 67.8)  66.2 pronated (was 69.6) 66.5 neutral (was 66.3)  64.9 supinated (was 70.8)  59.7 pronated (was 55.7)           Assessment: Tolerated treatment well. Pt reports minimal improvement over course of therapy. Objectively, there was improved AROM compared to her evaluation in January. However,  strength did not improve and was painful on today's visit. Pt consistently reported intermittent pain that worsened with increased activity and at home. Conservative therapy included heat, massage, stretching, AROM, gentle strengthening, kinesiotaping. Splinting was not indicated for pt based on special testing. Recommend she f/u with ortho to discuss further options. Pt understands/agrees with plan to discharge from OT at this " time.     Goals  Short term goals:  To be achieved within 2-3 visits from start of care on 01/15/25        Patient will demonstrate independence with recommended AROM and stretching HEPs.   Patient will demonstrate independence with postural/positional awareness and /ergonomic correction recommendations.  Patient will be instructed on the benefits and effective use of appropriate modalities for edema control, soft-tissue extensibility, and pain management.   Patient will be instructed on the benefits and effective use of appropriate adaptive equipment and/or bracing devices.   Patient will be report improved functional participation with implementation of recommended pacing, activity modifications, and symptom management strategies.      Long term goals:  To be achieved at time of discharge:   Patient will report decreased pain throughout normal roles/routines to <1/10.   Patient will report improved ability to complete ADLs/IADLs compared to IE.   Patient will demonstrate improved  and pinch strength to within 10% of the uninvolved side for increased readiness to return to gym and normal work routine.   Patient will report readiness for d/c from OT.     ALL GOALS MET    Plan: Discharge from OT     Precautions: B ulnar impaction syndrome with TFCC tear    Manuals  1/15 eval 3/18 3/25 visit #10 4/1 4/15    STM/IASTM B forearm flex/ext   15' 20'  20' 25'                                         Ther Ex              Forearm and wrist AROM   X20    Gentle wrist stretches X20    Gentle wrist stretches X20    Gentle wrist stretches X20    Gentle wrist stretches   Forearm and wrist isometrics    x20 x20   Static      Progress to dynamic  in various positions  Yellow hammer pronation/supination x20    Wrist curls 2x10 each direction 2-3lb    Green flex bar bend/twist  X30    Red Med ball press and roll into black digit web Yellow hammer pronation/supination x20    Wrist curls 2x10 each direction  2-3lb    Green flex bar bend/twist  X30    Red Med ball press and roll into black digit web Yellow hammer pronation/supination x20    Wrist curls 2x10 each direction 2-3lb    Green flex bar bend/twist  X30    Red Med ball press and roll into black digit web                                                                                        Ther Activity          Weight bearing          Activity modification                                                   Neuro Re-Ed                                   Ortho Fit         [place] PRN                          Modalities         HP PRN  did not want heat 5'

## 2025-04-21 ENCOUNTER — OFFICE VISIT (OUTPATIENT)
Dept: OBGYN CLINIC | Facility: CLINIC | Age: 60
End: 2025-04-21
Payer: OTHER MISCELLANEOUS

## 2025-04-21 DIAGNOSIS — S63.591A TFCC (TRIANGULAR FIBROCARTILAGE COMPLEX) TEAR, RIGHT, INITIAL ENCOUNTER: ICD-10-CM

## 2025-04-21 DIAGNOSIS — D68.9 BLOOD CLOTTING DISORDER (HCC): ICD-10-CM

## 2025-04-21 DIAGNOSIS — S63.592A TFCC (TRIANGULAR FIBROCARTILAGE COMPLEX) TEAR, LEFT, INITIAL ENCOUNTER: Primary | ICD-10-CM

## 2025-04-21 PROCEDURE — 99214 OFFICE O/P EST MOD 30 MIN: CPT | Performed by: ORTHOPAEDIC SURGERY

## 2025-04-21 NOTE — PROGRESS NOTES
ORTHOPAEDIC HAND, WRIST, AND ELBOW OFFICE  VISIT     Name: Che Ascencio      : 1965      MRN: [id number]  Encounter Provider: Stan Bonilla MD  Encounter Date: 2025   Encounter department: [facility] [place]  :  Assessment & Plan  TFCC (triangular fibrocartilage complex) tear, left, initial encounter  We discussed conservative and operative treatment with the patient at length  She did express that she has a history of blood clot disorder that is not charted in epic.  She states that she is currently not on any anticoagulation  We will order bilateral arterial and venous duplex ultrasounds to evaluate for recurrent blood clots  We will give her a referral to hematology to evaluate for her blood clotting disorder which she states is genetic  She would like to proceed with surgical intervention at this time  Detailed consent was obtained for right wrist arthroscopy with TFCC debridement versus repair with regional anesthesia  Risks and benefits of surgery were discussed  She will require primary care as well as hematology clearance for surgical intervention  She will follow-up after ultrasounds to review the results       TFCC (triangular fibrocartilage complex) tear, right, initial encounter  We discussed conservative and operative treatment with the patient at length  She did express that she has a history of blood clot disorder that is not charted in epic.  She states that she is currently not on any anticoagulation  We will order bilateral arterial and venous duplex ultrasounds to evaluate for recurrent blood clots  We will give her a referral to hematology to evaluate for her blood clotting disorder which she states is genetic  She would like to proceed with surgical intervention at this time  Detailed consent was obtained for right wrist arthroscopy with TFCC debridement versus repair with regional anesthesia  Risks and benefits of surgery were  discussed  She will require primary care as well as hematology clearance for surgical intervention  She will follow-up after ultrasounds to review the results  Orders:  •  Case request operating room: Right wrist arthroscopy with TFCC debridement verse repair; Standing    Blood clotting disorder (HCC)  We will evaluate bilateral upper extremities with venous and arterial duplex ultrasounds to evaluate for blood clots  Hematology referral was placed for blood clotting disorder that is unknown which will be needed to be cleared for surgical intervention  She will follow-up after ultrasound to go over the test results  Orders:  •  VAS upper limb venous duplex scan, complete, bilateral; Future  •  VAS upper limb arterial duplex, complete bilateral, graft; Future  •  Ambulatory Referral to Hematology / Oncology; Future        History of Present Illness   HPI  Chief Complaint   Patient presents with   • Left Wrist - Pain     TFCC tear - injection 1/13   • Right Wrist - Pain     TFCC tear - injection 1/13       Che Ascencio is a 60 y.o. female who presents for follow-up regarding bilateral wrist pain.     She states that the sharp shooting pains have subsided but will still have them from time to time.  She has been going to therapy and working on her range of motion.  She states that she has noticed an increase in weakness in both of her wrists.  She states with certain activities she will have pain and discomfort into her wrists and hands.  Denies any numbness or tingling.      REVIEW OF SYSTEMS:  General: no fever, no chills  HEENT:  No loss of hearing or eyesight problems  Eyes:  No red eyes  Respiratory:  No coughing, shortness of breath or wheezing  Cardiovascular:  No chest pain, no palpitations  GI:  Abdomen soft nontender, denies nausea  Endocrine:  No muscle weakness, no frequent urination, no excessive thirst  Urinary:  No dysuria, no incontinence  Musculoskeletal: see HPI and PE  SKIN:  No skin rash, no dry  skin  Neurological:  No headaches, no confusion  Psychiatric:  No suicide thoughts, no anxiety, no depression  Review of all other systems is negative    Objective   There were no vitals taken for this visit.     General: well developed and well nourished, alert, oriented times 3, and appears comfortable  Psychiatric: Normal  HEENT: Trachea Midline, No torticollis  Cardiovascular: No discernable arrhythmia  Pulmonary: No wheezing or stridor  Abdomen: No rebound or guarding  Extremities: No peripheral edema  Skin: No masses, erythema, lacerations, fluctation, ulcerations  Neurovascular: Sensation Intact to the Median, Ulnar, Radial Nerve, Motor Intact to the Median, Ulnar, Radial Nerve, and Pulses Intact    Musculoskeletal exam:  Bilateral wrist  No erythema edema or ecchymosis noted, skin is warm to touch  Tenderness to palpation over the prestyloid recess and ulnar side of the wrist at the UT ligament  She has full wrist range of motion with flexion and extension compared to the contralateral side  Sensation is intact to light touch  Capillary refill less than 2 seconds      STUDIES REVIEWED:  No Studies to review      PROCEDURES PERFORMED:  Procedures  No Procedures performed today      Scribe Attestation    I,:  Justice South PA-C am acting as a scribe while in the presence of the attending physician.:       I,:  Stan Bonilla MD personally performed the services described in this documentation    as scribed in my presence.:

## 2025-05-15 ENCOUNTER — HOSPITAL ENCOUNTER (OUTPATIENT)
Dept: NON INVASIVE DIAGNOSTICS | Facility: HOSPITAL | Age: 60
Discharge: HOME | End: 2025-05-15
Attending: PHYSICIAN ASSISTANT
Payer: COMMERCIAL

## 2025-05-15 ENCOUNTER — HOSPITAL ENCOUNTER (OUTPATIENT)
Dept: NON INVASIVE DIAGNOSTICS | Facility: HOSPITAL | Age: 60
Discharge: HOME | End: 2025-05-15
Payer: COMMERCIAL

## 2025-05-15 DIAGNOSIS — D68.9 BLOOD CLOTTING DISORDER (HCC): ICD-10-CM

## 2025-05-15 PROCEDURE — 93930 UPPER EXTREMITY STUDY: CPT

## 2025-05-15 PROCEDURE — 93970 EXTREMITY STUDY: CPT

## 2025-05-16 PROCEDURE — 93930 UPPER EXTREMITY STUDY: CPT | Performed by: SURGERY

## 2025-05-16 PROCEDURE — 93971 EXTREMITY STUDY: CPT | Performed by: SURGERY

## 2025-05-21 ENCOUNTER — CONSULT (OUTPATIENT)
Dept: HEMATOLOGY ONCOLOGY | Facility: CLINIC | Age: 60
End: 2025-05-21
Attending: PHYSICIAN ASSISTANT
Payer: COMMERCIAL

## 2025-05-21 ENCOUNTER — TELEPHONE (OUTPATIENT)
Age: 60
End: 2025-05-21

## 2025-05-21 ENCOUNTER — APPOINTMENT (OUTPATIENT)
Dept: LAB | Facility: AMBULARY SURGERY CENTER | Age: 60
End: 2025-05-21
Attending: INTERNAL MEDICINE
Payer: OTHER MISCELLANEOUS

## 2025-05-21 VITALS
OXYGEN SATURATION: 98 % | HEIGHT: 64 IN | BODY MASS INDEX: 34.79 KG/M2 | SYSTOLIC BLOOD PRESSURE: 118 MMHG | TEMPERATURE: 97.6 F | DIASTOLIC BLOOD PRESSURE: 78 MMHG | WEIGHT: 203.8 LBS | HEART RATE: 81 BPM | RESPIRATION RATE: 17 BRPM

## 2025-05-21 DIAGNOSIS — D68.9 COAGULOPATHY (HCC): ICD-10-CM

## 2025-05-21 DIAGNOSIS — D68.9 COAGULOPATHY (HCC): Primary | ICD-10-CM

## 2025-05-21 LAB
APTT PPP: 26 SECONDS (ref 23–34)
BASOPHILS # BLD AUTO: 0.04 THOUSANDS/ÂΜL (ref 0–0.1)
BASOPHILS NFR BLD AUTO: 1 % (ref 0–1)
EOSINOPHIL # BLD AUTO: 0.22 THOUSAND/ÂΜL (ref 0–0.61)
EOSINOPHIL NFR BLD AUTO: 4 % (ref 0–6)
ERYTHROCYTE [DISTWIDTH] IN BLOOD BY AUTOMATED COUNT: 12.4 % (ref 11.6–15.1)
HCT VFR BLD AUTO: 47.2 % (ref 34.8–46.1)
HGB BLD-MCNC: 15.2 G/DL (ref 11.5–15.4)
IMM GRANULOCYTES # BLD AUTO: 0.01 THOUSAND/UL (ref 0–0.2)
IMM GRANULOCYTES NFR BLD AUTO: 0 % (ref 0–2)
INR PPP: 0.85 (ref 0.85–1.19)
LYMPHOCYTES # BLD AUTO: 1.49 THOUSANDS/ÂΜL (ref 0.6–4.47)
LYMPHOCYTES NFR BLD AUTO: 29 % (ref 14–44)
MCH RBC QN AUTO: 29.6 PG (ref 26.8–34.3)
MCHC RBC AUTO-ENTMCNC: 32.2 G/DL (ref 31.4–37.4)
MCV RBC AUTO: 92 FL (ref 82–98)
MONOCYTES # BLD AUTO: 0.25 THOUSAND/ÂΜL (ref 0.17–1.22)
MONOCYTES NFR BLD AUTO: 5 % (ref 4–12)
NEUTROPHILS # BLD AUTO: 3.22 THOUSANDS/ÂΜL (ref 1.85–7.62)
NEUTS SEG NFR BLD AUTO: 61 % (ref 43–75)
NRBC BLD AUTO-RTO: 0 /100 WBCS
PLATELET # BLD AUTO: 283 THOUSANDS/UL (ref 149–390)
PMV BLD AUTO: 9.6 FL (ref 8.9–12.7)
PROTHROMBIN TIME: 11.9 SECONDS (ref 12.3–15)
RBC # BLD AUTO: 5.14 MILLION/UL (ref 3.81–5.12)
WBC # BLD AUTO: 5.23 THOUSAND/UL (ref 4.31–10.16)

## 2025-05-21 PROCEDURE — 85303 CLOT INHIBIT PROT C ACTIVITY: CPT

## 2025-05-21 PROCEDURE — 85670 THROMBIN TIME PLASMA: CPT

## 2025-05-21 PROCEDURE — 85246 CLOT FACTOR VIII VW ANTIGEN: CPT

## 2025-05-21 PROCEDURE — 85730 THROMBOPLASTIN TIME PARTIAL: CPT

## 2025-05-21 PROCEDURE — 85705 THROMBOPLASTIN INHIBITION: CPT

## 2025-05-21 PROCEDURE — 85732 THROMBOPLASTIN TIME PARTIAL: CPT

## 2025-05-21 PROCEDURE — 36415 COLL VENOUS BLD VENIPUNCTURE: CPT

## 2025-05-21 PROCEDURE — 85240 CLOT FACTOR VIII AHG 1 STAGE: CPT

## 2025-05-21 PROCEDURE — 86147 CARDIOLIPIN ANTIBODY EA IG: CPT

## 2025-05-21 PROCEDURE — 85025 COMPLETE CBC W/AUTO DIFF WBC: CPT

## 2025-05-21 PROCEDURE — 85610 PROTHROMBIN TIME: CPT

## 2025-05-21 PROCEDURE — 85613 RUSSELL VIPER VENOM DILUTED: CPT

## 2025-05-21 PROCEDURE — 86146 BETA-2 GLYCOPROTEIN ANTIBODY: CPT

## 2025-05-21 PROCEDURE — 99245 OFF/OP CONSLTJ NEW/EST HI 55: CPT | Performed by: INTERNAL MEDICINE

## 2025-05-21 PROCEDURE — 85300 ANTITHROMBIN III ACTIVITY: CPT

## 2025-05-21 PROCEDURE — 85306 CLOT INHIBIT PROT S FREE: CPT

## 2025-05-21 NOTE — ASSESSMENT & PLAN NOTE
Based on history, I suspect she may have factor V Leiden mutation.  We reviewed that this condition increases risk of clotting.  If factor V mutation alone is noted, I think reasonable thromboprophylaxis for bilateral wrist surgery could include aspirin 81 mg p.o. daily for a month or so postoperatively.  If multiple abnormalities were noted, Lovenox would likely be favored.  If phospholipids were noted, warfarin would be considered.  I reviewed the above with the patient.  She voiced understanding and agreement.  I asked her to call 3 days after she has blood work done so that we can review the results and solidify perioperative anticoagulation plan as applicable.  Orders:  •  Antithrombin III Activity; Future  •  Beta-2 glycoprotein antibodies; Future  •  Cardiolipin antibody; Future  •  Factor 5 leiden; Future  •  Factor 8 activity; Future  •  Factor 8 Antigen; Future  •  Lupus anticoagulant; Future  •  Protein C activity; Future  •  Protein S activity; Future  •  APTT; Future  •  Protime-INR; Future  •  CBC and differential; Future

## 2025-05-21 NOTE — TELEPHONE ENCOUNTER
Please print lab orders from Dr Dawn and mail to patient. Mailing address verified as same on file. Thank you!

## 2025-05-21 NOTE — PROGRESS NOTES
Name: Che Ascencio      : 1965      MRN: [id number]  Encounter Provider: Ghanshyam Dawn DO  Encounter Date: 2025   Encounter department: [facility] [place]  :  Assessment & Plan  Coagulopathy (HCC)  Based on history, I suspect she may have factor V Leiden mutation.  We reviewed that this condition increases risk of clotting.  If factor V mutation alone is noted, I think reasonable thromboprophylaxis for bilateral wrist surgery could include aspirin 81 mg p.o. daily for a month or so postoperatively.  If multiple abnormalities were noted, Lovenox would likely be favored.  If phospholipids were noted, warfarin would be considered.  I reviewed the above with the patient.  She voiced understanding and agreement.  I asked her to call 3 days after she has blood work done so that we can review the results and solidify perioperative anticoagulation plan as applicable.  Orders:  •  Antithrombin III Activity; Future  •  Beta-2 glycoprotein antibodies; Future  •  Cardiolipin antibody; Future  •  Factor 5 leiden; Future  •  Factor 8 activity; Future  •  Factor 8 Antigen; Future  •  Lupus anticoagulant; Future  •  Protein C activity; Future  •  Protein S activity; Future  •  APTT; Future  •  Protime-INR; Future  •  CBC and differential; Future        Return if symptoms worsen or fail to improve, for Labs - See orders.    History of Present Illness   Chief Complaint   Patient presents with   • Consult       Pertinent Medical History    In approximately  patient was started on oral contraceptives for endometriosis.  Within 2 months she developed a left leg DVT.  She was treated with Coumadin for about a year, discontinued per PCP.   patient had left shoulder surgery.  She had contrasted imaging with resultant substantial bruising about the left shoulder.  Some swelling and pain in the left arm.  She does not believe she had Doppler but was started on Coumadin for the  "possibility of DVT there.  Approximately 3 months.   She believes she saw hematologist with blood testing.  She recalls \" a genetic disorder with a 5.\"  A few years later she had some pain in the left wrist.  She states Doppler showed what sounds like superficial thrombophlebitis.  Treated with aspirin for about 3 months, symptoms resolved.  In approximately 2021, COVID vaccination resulted in substantial myalgias, arthralgias, decreased mobility.  Developed left calf pain, self treated with aspirin on and off for few months.  No imaging.  Symptoms resolved.       Review of Systems   Constitutional:  Negative for appetite change, diaphoresis, fatigue and fever.   HENT:  Negative for sinus pain.    Eyes:  Negative for discharge.   Respiratory:  Negative for cough and shortness of breath.    Cardiovascular:  Negative for chest pain.   Gastrointestinal:  Negative for abdominal pain, constipation and diarrhea.   Endocrine: Negative for cold intolerance.   Genitourinary:  Negative for difficulty urinating and hematuria.   Musculoskeletal:  Positive for arthralgias (BL wrists). Negative for joint swelling.   Skin:  Negative for rash.   Allergic/Immunologic: Negative for environmental allergies.   Neurological:  Negative for dizziness and headaches.   Hematological:  Negative for adenopathy.   Psychiatric/Behavioral:  Negative for agitation.            Objective   /78 (BP Location: Left arm, Patient Position: Sitting, Cuff Size: Adult)   Pulse 81   Temp 97.6 °F (36.4 °C) (Temporal)   Resp 17   Ht 5' 4.33\" (1.634 m)   Wt 92.4 kg (203 lb 12.8 oz)   SpO2 98%   BMI 34.62 kg/m²     Pain Screening:  Pain Score:   5  ECOG ECOG Performance Status: 1 - Restricted in physically strenuous activity but ambulatory and able to carry out work of a light or sedentary nature, e.g., light house work, office work   Physical Exam  Constitutional:       Appearance: She is well-developed.   HENT:      Head: Normocephalic and " "atraumatic.     Eyes:      Pupils: Pupils are equal, round, and reactive to light.       Cardiovascular:      Rate and Rhythm: Normal rate.      Heart sounds: No murmur heard.  Pulmonary:      Effort: No respiratory distress.      Breath sounds: No wheezing or rales.   Abdominal:      General: There is no distension.      Palpations: Abdomen is soft.      Tenderness: There is no abdominal tenderness. There is no rebound.     Musculoskeletal:         General: No tenderness.      Cervical back: Neck supple.   Lymphadenopathy:      Cervical: No cervical adenopathy.     Skin:     General: Skin is warm.      Findings: No rash.     Neurological:      Mental Status: She is alert and oriented to person, place, and time.      Deep Tendon Reflexes: Reflexes normal.     Psychiatric:         Thought Content: Thought content normal.         Labs: I have reviewed the following labs:  No results found for: \"WBC\", \"RBC\", \"HGB\", \"HCT\", \"MCV\", \"MCH\", \"RDW\", \"PLT\", \"NEUTOPHILPCT\", \"LYMPHOPCT\", \"MONOPCT\", \"EOSPCT\", \"BASOPCT\", \"IMMGRANS\", \"NEUTROABS\"  No results found for: \"NA\", \"K\", \"CL\", \"CO2\", \"ANIONGAP\", \"BUN\", \"CREATININE\", \"GLUCOSE\", \"GLUF\", \"CALCIUM\", \"CORRECTEDCA\", \"AST\", \"ALT\", \"ALKPHOS\", \"TP\", \"ALB\", \"TBILI\", \"EGFR\"          "

## 2025-05-22 LAB
B2 GLYCOPROT1 IGA SERPL IA-ACNC: 1.2
B2 GLYCOPROT1 IGG SERPL IA-ACNC: <0.8
B2 GLYCOPROT1 IGM SERPL IA-ACNC: <2.4
CARDIOLIPIN IGA SER IA-ACNC: 2.1
CARDIOLIPIN IGG SER IA-ACNC: 0.8
CARDIOLIPIN IGM SER IA-ACNC: 2.4
DEPRECATED AT III PPP: 135 % OF NORMAL (ref 92–136)
PROT C AG ACT/NOR PPP IA: 135 % OF NORMAL (ref 60–150)
PROT S ACT/NOR PPP: 88 % (ref 68–108)

## 2025-05-23 LAB
APTT SCREEN TO CONFIRM RATIO: 1.23 RATIO (ref 0–1.34)
CONFIRM APTT/NORMAL: 40.2 SEC (ref 0–47.6)
FACT XIIIA PPP-ACNC: 117 % (ref 56–140)
LA PPP-IMP: NORMAL
SCREEN APTT: 33 SEC (ref 0–43.5)
SCREEN DRVVT: 41.4 SEC (ref 0–47)
THROMBIN TIME: 19.2 SEC (ref 0–23)

## 2025-05-28 LAB — FACT VIII AG ACT/NOR PPP IA: 108 %

## 2025-05-29 ENCOUNTER — APPOINTMENT (OUTPATIENT)
Dept: LAB | Facility: AMBULARY SURGERY CENTER | Age: 60
End: 2025-05-29
Attending: FAMILY MEDICINE
Payer: COMMERCIAL

## 2025-05-29 ENCOUNTER — TELEPHONE (OUTPATIENT)
Age: 60
End: 2025-05-29

## 2025-05-29 NOTE — TELEPHONE ENCOUNTER
Authorizations for genetic [facility] are [facility] - patient must call [phone] for the status of the authorization.      Thank you,   Nicole WELCH

## 2025-05-29 NOTE — TELEPHONE ENCOUNTER
Pt called and she wanted to check the status of approval for factor 5 lab test. And if not covered is it a necessary test? Pt would like a call back. Thank you

## 2025-05-30 ENCOUNTER — RESULTS FOLLOW-UP (OUTPATIENT)
Dept: FAMILY MEDICINE CLINIC | Facility: CLINIC | Age: 60
End: 2025-05-30

## 2025-05-30 NOTE — TELEPHONE ENCOUNTER
Pt called back gave phone number for [employer] to check auth status. Pt would like to know if not covered its the test necessary to have surgery done. Pt would like a call back. Thank you

## 2025-05-30 NOTE — TELEPHONE ENCOUNTER
Patient calling in, stated she called [employer] as instructed about the authorization for testing, they stated that had nothing to do with them when they were called, patient is confused, please assist. Best # [phone] for the patient

## 2025-06-06 ENCOUNTER — APPOINTMENT (OUTPATIENT)
Dept: LAB | Facility: AMBULARY SURGERY CENTER | Age: 60
End: 2025-06-06
Payer: COMMERCIAL

## 2025-06-06 ENCOUNTER — APPOINTMENT (OUTPATIENT)
Dept: LAB | Facility: AMBULARY SURGERY CENTER | Age: 60
End: 2025-06-06
Attending: INTERNAL MEDICINE
Payer: COMMERCIAL

## 2025-06-06 ENCOUNTER — APPOINTMENT (OUTPATIENT)
Dept: LAB | Facility: HOSPITAL | Age: 60
End: 2025-06-06
Attending: INTERNAL MEDICINE
Payer: OTHER MISCELLANEOUS

## 2025-06-06 DIAGNOSIS — D68.9 COAGULOPATHY (HCC): ICD-10-CM

## 2025-06-06 PROCEDURE — 36415 COLL VENOUS BLD VENIPUNCTURE: CPT

## 2025-06-13 LAB
F5 GENE MUT ANL BLD/T: NORMAL
Lab: NORMAL

## 2025-06-23 ENCOUNTER — OFFICE VISIT (OUTPATIENT)
Dept: OBGYN CLINIC | Facility: CLINIC | Age: 60
End: 2025-06-23
Payer: OTHER MISCELLANEOUS

## 2025-06-23 VITALS — HEIGHT: 64 IN | BODY MASS INDEX: 34.31 KG/M2 | WEIGHT: 201 LBS

## 2025-06-23 DIAGNOSIS — M25.831 ULNAR IMPACTION SYNDROME, RIGHT: ICD-10-CM

## 2025-06-23 DIAGNOSIS — S63.591A TFCC (TRIANGULAR FIBROCARTILAGE COMPLEX) TEAR, RIGHT, INITIAL ENCOUNTER: ICD-10-CM

## 2025-06-23 DIAGNOSIS — S63.592A TFCC (TRIANGULAR FIBROCARTILAGE COMPLEX) TEAR, LEFT, INITIAL ENCOUNTER: Primary | ICD-10-CM

## 2025-06-23 DIAGNOSIS — M25.832 ULNAR IMPACTION SYNDROME, LEFT: ICD-10-CM

## 2025-06-23 PROCEDURE — 99213 OFFICE O/P EST LOW 20 MIN: CPT | Performed by: ORTHOPAEDIC SURGERY

## 2025-06-23 NOTE — PROGRESS NOTES
ORTHOPAEDIC HAND, WRIST, AND ELBOW OFFICE  VISIT     Name: Che M Sonu      : 1965      MRN: [id number]  Encounter Provider: Stan Bonilla MD  Encounter Date: 2025   Encounter department: [facility] [place]  :  Assessment & Plan  TFCC (triangular fibrocartilage complex) tear, left, initial encounter  Ulnar impaction syndrome, left  We discussed conservative and operative treatments with the patient at length  She did see her hematologist and had blood work done of which a message was sent to the hematologist in regards to postoperative anticoagulation  We discussed right wrist arthroscopy with TFCC debridement versus repair with regional anesthesia  We discussed the recovery process as well  She would like to hold off on surgery at this time  She will follow-up in 5 weeks and she would like to try cortisone injections for her wrist at that time.    Orders:  •  Ambulatory Referral to PT/OT Hand Therapy; Future    TFCC (triangular fibrocartilage complex) tear, right, initial encounter  Ulnar impaction syndrome, right  We discussed conservative and operative treatments with the patient at length  She did see her hematologist and had blood work done of which a message was sent to the hematologist in regards to postoperative anticoagulation  We discussed right wrist arthroscopy with TFCC debridement versus repair with regional anesthesia  We discussed the recovery process as well  She would like to hold off on surgery at this time  She will follow-up in 5 weeks and she would like to try cortisone injections for her wrist at that time.    Orders:  •  Ambulatory Referral to PT/OT Hand Therapy; Future        History of Present Illness   HPI  Chief Complaint   Patient presents with   • Left Wrist - Pain     Venous Duplex US- 5/15/25  TFCC tear - injection    • Right Wrist - Pain     Venous Duplex US- 5/15/25  TFCC tear - injection        Che Ascencio is a 60  "y.o. female who presents for follow-up regarding bilateral wrist pain.  Patient was last seen 4/21/2025 where referral to hematology was given to patient as she states she has a history of blood clot disorder and right wrist arthroscopy with TFCC debridement versus repair with regional anesthesia was discussed based on hematology clearance.  She states that she was able to see hematology where they still need to review some of her labs.  She states she continues to have pain within bilateral wrist along the ulnar aspects.    REVIEW OF SYSTEMS:  General: no fever, no chills  HEENT:  No loss of hearing or eyesight problems  Eyes:  No red eyes  Respiratory:  No coughing, shortness of breath or wheezing  Cardiovascular:  No chest pain, no palpitations  GI:  Abdomen soft nontender, denies nausea  Endocrine:  No muscle weakness, no frequent urination, no excessive thirst  Urinary:  No dysuria, no incontinence  Musculoskeletal: see HPI and PE  SKIN:  No skin rash, no dry skin  Neurological:  No headaches, no confusion  Psychiatric:  No suicide thoughts, no anxiety, no depression  Review of all other systems is negative    Objective   Ht 5' 4.33\" (1.634 m)   Wt 91.2 kg (201 lb)   BMI 34.15 kg/m²      General: well developed and well nourished, alert, oriented times 3, and appears comfortable  Psychiatric: Normal  HEENT: Trachea Midline, No torticollis  Cardiovascular: No discernable arrhythmia  Pulmonary: No wheezing or stridor  Abdomen: No rebound or guarding  Extremities: No peripheral edema  Skin: No masses, erythema, lacerations, fluctation, ulcerations  Neurovascular: Sensation Intact to the Median, Ulnar, Radial Nerve, Motor Intact to the Median, Ulnar, Radial Nerve, and Pulses Intact    Musculoskeletal exam:  Bilateral wrist  No erythema edema or ecchymosis noted, skin is warm to touch  Tenderness to palpation over the prestyloid recess and ulnar side of the wrist at the UT ligament  She has full wrist range of " motion with flexion and extension compared to the contralateral side  Sensation is intact to light touch  Capillary refill less than 2 seconds      STUDIES REVIEWED:  No Studies to review      PROCEDURES PERFORMED:  Procedures  No Procedures performed today            Scribe Attestation    I,:  Justice South PA-C am acting as a scribe while in the presence of the attending physician.:       I,:  Stan Bonilla MD personally performed the services described in this documentation    as scribed in my presence.:

## 2025-06-23 NOTE — LETTER
June 23, 2025     Patient: Che Ascencio  YOB: 1965  Date of Visit: 6/23/2025      To Whom it May Concern:    Che Ascencio is under my professional care. Che was seen in my office on 6/23/2025. She can return to work without restrictions.     If you have any questions or concerns, please don't hesitate to call.          Sincerely,          Stan Bonilla MD        CC: No Recipients

## 2025-07-22 ENCOUNTER — EVALUATION (OUTPATIENT)
Dept: PHYSICAL THERAPY | Facility: CLINIC | Age: 60
End: 2025-07-22
Attending: PHYSICIAN ASSISTANT
Payer: OTHER MISCELLANEOUS

## 2025-07-22 DIAGNOSIS — S63.592A TFCC (TRIANGULAR FIBROCARTILAGE COMPLEX) TEAR, LEFT, INITIAL ENCOUNTER: Primary | ICD-10-CM

## 2025-07-22 DIAGNOSIS — S63.591A TFCC (TRIANGULAR FIBROCARTILAGE COMPLEX) TEAR, RIGHT, INITIAL ENCOUNTER: ICD-10-CM

## 2025-07-22 DIAGNOSIS — M25.831 ULNAR IMPACTION SYNDROME, RIGHT: ICD-10-CM

## 2025-07-22 DIAGNOSIS — M25.832 ULNAR IMPACTION SYNDROME, LEFT: ICD-10-CM

## 2025-07-22 PROCEDURE — 97535 SELF CARE MNGMENT TRAINING: CPT | Performed by: PHYSICAL THERAPIST

## 2025-07-22 PROCEDURE — 97161 PT EVAL LOW COMPLEX 20 MIN: CPT | Performed by: PHYSICAL THERAPIST

## 2025-07-22 PROCEDURE — 97110 THERAPEUTIC EXERCISES: CPT | Performed by: PHYSICAL THERAPIST

## 2025-07-22 NOTE — HOME EXERCISE EDUCATION
Program_ID:[id number]   Access Code: [email or web address]  URL: [email or web address]  Date: 07-  Prepared By: Marcelo Goncalves    Program Notes      Exercises      - Seated Wrist Extension with Dumbbell - 2 x daily - 7 x weekly - 3 sets - 10 reps      - Seated Wrist Flexion with Dumbbell - 2 x daily - 7 x weekly - 3 sets - 10 reps      - Seated Wrist Supination Pronation with Can - 2 x daily - 7 x weekly - 3 sets - 10 reps      - Seated Isometric Wrist Radial Deviation with Manual Resistance - 2 x daily - 7 x weekly - 1 sets - 10 reps - 10&#34; hold

## 2025-07-22 NOTE — PROGRESS NOTES
PT Evaluation     Today's date: 2025  Patient name: Che Ascencio  : 1965  MRN: [id number]  Referring provider: Justice South PA-C  Dx:   Encounter Diagnosis     ICD-10-CM    1. TFCC (triangular fibrocartilage complex) tear, left, initial encounter  S63.592A Ambulatory Referral to PT/OT Hand Therapy      2. TFCC (triangular fibrocartilage complex) tear, right, initial encounter  S63.591A Ambulatory Referral to PT/OT Hand Therapy      3. Ulnar impaction syndrome, left  M25.832 Ambulatory Referral to PT/OT Hand Therapy      4. Ulnar impaction syndrome, right  M25.831 Ambulatory Referral to PT/OT Hand Therapy                     Assessment  Impairments: activity intolerance, impaired physical strength, lacks appropriate home exercise program, pain with function, participation limitations and activity limitations    Assessment details: Pt demonstrated B wrist instability, pain and limited function.  Pt will benefit from PT to address impairments and restore function.    Goals  ST-4 weeks.  1.  Pt will decrease pain > 25%.  2.  Pt will increase wrist ext strength > 25%.    LT-8 weeks.  1.  Pt will decrease pain > 50%.  2.  Pt will increase wrist extension MMT > 50%.    Plan    Planned therapy interventions: neuromuscular re-education, self care, strengthening, stretching, therapeutic activities, therapeutic exercise, graded activity, graded exercise and home exercise program    Frequency: 1x week  Duration in weeks: 8        Subjective Evaluation    History of Present Illness  Mechanism of injury: Pt is a 60 yof c/o B wrist pain that began in 2024 after demonstrating a push-up and dips while at work as an .  Pt is R hand dominant.  Patient Goals  Patient goals for therapy: decreased pain, increased strength, independence with ADLs/IADLs, return to sport/leisure activities and return to work    Pain  Current pain ratin  At best pain ratin  At worst pain rating:  "8  Quality: dull ache  Relieving factors: rest, change in position and medications  Exacerbated by: carrying heavy weight, lifting, reaching, WB through wrists, ripping tape.  Progression: no change      Diagnostic Tests  X-ray: normal (11/29/24)  MRI studies: abnormal (12/22/24 L small tear scapholunate lig, R Triangle fibocartilage tear.)        Objective     Active Range of Motion     Left Wrist   Wrist flexion: 60 degrees with pain  Wrist extension: 68 degrees with pain  Radial deviation: 15 degrees with pain  Ulnar deviation: 40 degrees with pain      Right Wrist   Wrist flexion: 65 degrees with pain  Wrist extension: 70 degrees with pain  Radial deviation: 15 degrees with pain  Ulnar deviation: 40 degrees with pain    Strength/Myotome Testing     Left Wrist/Hand   Wrist extension: 4-  Wrist flexion: 4-  Radial deviation: 3+  Ulnar deviation: 3+     (2nd hand position)     Trial 1: 68    Right Wrist/Hand   Wrist extension: 4-  Wrist flexion: 4-  Radial deviation: 3+  Ulnar deviation: 3+     (2nd hand position)     Trial 1: 65    Additional Strength Details  Functional baselines:  1.  Mor[place] Sun carry: 15#, 3-4/10 pain 1 lap  2.  Chair push-ups 2-3/10 pain 1 rep  3.  Ripping tape: 7/10 pain  4.  Opening jars: 7/10 pain  5.  Table push-ups 36\" 5/10 pain             Dx: B TFCC tear.    Manuals 7/23            Laser                                                    Neuro Re-Ed                                                                                                        Ther Ex             Ecc mid-range wrist ext 1#/ 1x10 1#/ 3x10           Mid-range wrist flex 1#/ 1x10 1#/ 3x10           Mid-range sup/pro 2#/ 1x10 2#/ 3x10           Iso neutral hold wrist radial dev 3#/ 30 sec 3#/ 60 sec x2                                                                            Ther Activity             Dunne's carry  10# KB/ 2 laps                        Gait Training                                     "   Modalities

## 2025-07-29 ENCOUNTER — OFFICE VISIT (OUTPATIENT)
Dept: PHYSICAL THERAPY | Facility: CLINIC | Age: 60
End: 2025-07-29
Attending: PHYSICIAN ASSISTANT
Payer: OTHER MISCELLANEOUS

## 2025-07-29 DIAGNOSIS — S63.592A TFCC (TRIANGULAR FIBROCARTILAGE COMPLEX) TEAR, LEFT, INITIAL ENCOUNTER: Primary | ICD-10-CM

## 2025-07-29 DIAGNOSIS — S63.591A TFCC (TRIANGULAR FIBROCARTILAGE COMPLEX) TEAR, RIGHT, INITIAL ENCOUNTER: ICD-10-CM

## 2025-07-29 DIAGNOSIS — M25.831 ULNAR IMPACTION SYNDROME, RIGHT: ICD-10-CM

## 2025-07-29 DIAGNOSIS — M25.832 ULNAR IMPACTION SYNDROME, LEFT: ICD-10-CM

## 2025-07-29 PROCEDURE — 97110 THERAPEUTIC EXERCISES: CPT | Performed by: PHYSICAL THERAPIST

## 2025-08-01 ENCOUNTER — OFFICE VISIT (OUTPATIENT)
Dept: OBGYN CLINIC | Facility: HOSPITAL | Age: 60
End: 2025-08-01
Payer: OTHER MISCELLANEOUS

## 2025-08-01 VITALS — BODY MASS INDEX: 34.31 KG/M2 | WEIGHT: 201 LBS | HEIGHT: 64 IN

## 2025-08-01 DIAGNOSIS — S63.592A TFCC (TRIANGULAR FIBROCARTILAGE COMPLEX) TEAR, LEFT, INITIAL ENCOUNTER: Primary | ICD-10-CM

## 2025-08-01 DIAGNOSIS — S63.591A TFCC (TRIANGULAR FIBROCARTILAGE COMPLEX) TEAR, RIGHT, INITIAL ENCOUNTER: ICD-10-CM

## 2025-08-01 PROCEDURE — 20605 DRAIN/INJ JOINT/BURSA W/O US: CPT | Performed by: PHYSICIAN ASSISTANT

## 2025-08-01 PROCEDURE — 99213 OFFICE O/P EST LOW 20 MIN: CPT | Performed by: PHYSICIAN ASSISTANT

## 2025-08-01 RX ORDER — LIDOCAINE HYDROCHLORIDE 10 MG/ML
1 INJECTION, SOLUTION INFILTRATION; PERINEURAL
Status: COMPLETED | OUTPATIENT
Start: 2025-08-01 | End: 2025-08-01

## 2025-08-01 RX ORDER — TRIAMCINOLONE ACETONIDE 40 MG/ML
40 INJECTION, SUSPENSION INTRA-ARTICULAR; INTRAMUSCULAR
Status: COMPLETED | OUTPATIENT
Start: 2025-08-01 | End: 2025-08-01

## 2025-08-01 RX ADMIN — LIDOCAINE HYDROCHLORIDE 1 ML: 10 INJECTION, SOLUTION INFILTRATION; PERINEURAL at 08:15

## 2025-08-01 RX ADMIN — TRIAMCINOLONE ACETONIDE 40 MG: 40 INJECTION, SUSPENSION INTRA-ARTICULAR; INTRAMUSCULAR at 08:15

## 2025-08-15 ENCOUNTER — TELEPHONE (OUTPATIENT)
Dept: OBGYN CLINIC | Facility: CLINIC | Age: 60
End: 2025-08-15

## 2025-08-18 ENCOUNTER — TELEPHONE (OUTPATIENT)
Age: 60
End: 2025-08-18